# Patient Record
Sex: MALE | Employment: OTHER
[De-identification: names, ages, dates, MRNs, and addresses within clinical notes are randomized per-mention and may not be internally consistent; named-entity substitution may affect disease eponyms.]

---

## 2020-12-02 ENCOUNTER — TRANSCRIBE ORDERS (OUTPATIENT)
Dept: OTHER | Age: 80
End: 2020-12-02

## 2020-12-02 DIAGNOSIS — D3A.8 NEUROENDOCRINE TUMOR OF PANCREAS (H): Primary | ICD-10-CM

## 2020-12-04 NOTE — TELEPHONE ENCOUNTER
ONCOLOGY INTAKE: Records Information      APPT INFORMATION: 12/28/20 - Monique - Haroldo  Referring provider:  Leonel Quinteros  Referring provider s clinic:  [facility]  Reason for visit/diagnosis:  neuroendocrine tumor of pancreas  Has patient been notified of appointment date and time?: yes    RECORDS INFORMATION:  Were the records received with the referral (via [employer])? In [facility]/    Has patient been seen for any external appt for this diagnosis? Yes - [facility]    ADDITIONAL INFORMATION:  Scheduled via [facility]  I called Clare & confirmed a video visit with Dr Amaya for 12/28/20 at 11am. Clare is getting a new computer and will have his daughter assist him with the video visit - Instructions sent via email to [email or web address]

## 2020-12-07 NOTE — TELEPHONE ENCOUNTER
Action    Action Taken 12/7/20:    -[facility] [id number]    -Imaging resolved, and now viewable to PACS  2:55 PM       RECORDS STATUS - ALL OTHER DIAGNOSIS      RECORDS RECEIVED FROM: [facility]   DATE RECEIVED:    NOTES STATUS DETAILS   OFFICE NOTE from referring provider ERIC  Jolie Eller - PAC: 11/30/20   OFFICE NOTE from medical oncologist     DIS[facility]   DISCHARGE REPORT from the ER     OPERATIVE REPORT [facility] 11/16/20: EUS   MEDICATION LIST [facility]   CLINICAL TRIAL TREATMENTS TO DATE     LABS     PATHOLOGY REPORTS [facility], Report in CE, Slides requested 12/7 11/16/20   ANYTHING RELATED TO DIAGNOSIS     GENONOMIC TESTING     TYPE:     IMAGING (NEED IMAGES & REPORT)     CT SCANS PACS 10/27/20: [facility]   MRI     MAMMO     ULTRASOUND     PET

## 2020-12-10 NOTE — TELEPHONE ENCOUNTER
Pathology received from [facility] 12/10/2020 and taken to 5th floor to be processed

## 2020-12-11 LAB — COPATH REPORT: NORMAL

## 2020-12-11 PROCEDURE — 999N001032 HC STATISTIC REVIEW OUTSIDE SLIDES TC 88321: Performed by: INTERNAL MEDICINE

## 2020-12-11 PROCEDURE — 88321 CONSLTJ&REPRT SLD PREP ELSWR: CPT | Performed by: PATHOLOGY

## 2020-12-28 ENCOUNTER — PRE VISIT (OUTPATIENT)
Dept: ONCOLOGY | Facility: CLINIC | Age: 80
End: 2020-12-28

## 2020-12-28 ENCOUNTER — VIRTUAL VISIT (OUTPATIENT)
Dept: ONCOLOGY | Facility: CLINIC | Age: 80
End: 2020-12-28
Attending: INTERNAL MEDICINE
Payer: MEDICARE

## 2020-12-28 DIAGNOSIS — D3A.8 NEUROENDOCRINE TUMOR OF PANCREAS (H): ICD-10-CM

## 2020-12-28 PROCEDURE — 99204 OFFICE O/P NEW MOD 45 MIN: CPT | Mod: 95 | Performed by: INTERNAL MEDICINE

## 2020-12-28 PROCEDURE — 999N001193 HC VIDEO/TELEPHONE VISIT; NO CHARGE

## 2020-12-28 RX ORDER — DILTIAZEM HYDROCHLORIDE 120 MG/1
CAPSULE, EXTENDED RELEASE ORAL
COMMUNITY
Start: 2020-02-27

## 2020-12-28 RX ORDER — SULFASALAZINE 500 MG/1
TABLET ORAL
COMMUNITY
Start: 2020-02-27

## 2020-12-28 RX ORDER — CHLORAL HYDRATE 500 MG
1 CAPSULE ORAL
COMMUNITY

## 2020-12-28 RX ORDER — MECLIZINE HCL 25MG 25 MG/1
25 TABLET, CHEWABLE ORAL
COMMUNITY
Start: 2020-11-30 | End: 2020-12-30

## 2020-12-28 RX ORDER — MECLIZINE HYDROCHLORIDE 25 MG/1
25 TABLET ORAL
COMMUNITY

## 2020-12-28 RX ORDER — FINASTERIDE 5 MG/1
TABLET, FILM COATED ORAL
COMMUNITY
Start: 2019-02-21

## 2020-12-28 RX ORDER — SPIRONOLACTONE 25 MG/1
TABLET ORAL
COMMUNITY
Start: 2020-02-27

## 2020-12-28 RX ORDER — ASCORBIC ACID 500 MG
TABLET ORAL
COMMUNITY
Start: 2019-02-21

## 2020-12-28 RX ORDER — UBIDECARENONE 100 MG
100 CAPSULE ORAL
COMMUNITY

## 2020-12-28 RX ORDER — ASPIRIN 81 MG/1
325 TABLET, CHEWABLE ORAL
COMMUNITY
Start: 2019-02-21

## 2020-12-28 NOTE — LETTER
"    12/28/2020         RE: Sean Roblero  [id number] [facility] [id number]        Dear Colleague,    Thank you for referring your patient, Sean Roblero, to the [facility]. Please see a copy of my visit note below.    Sean Roblero is a 80 year old male who is being evaluated via a billable video visit.      The patient has been notified of following:     \"This video visit will be conducted via a call between you and your physician/provider. We have found that certain health care needs can be provided without the need for an in-person physical exam.  This service lets us provide the care you need with a video conversation.  If a prescription is necessary we can send it directly to your pharmacy.  If lab work is needed we can place an order for that and you can then stop by our lab to have the test done at a later time.    Video visits are billed at different rates depending on your insurance coverage.  Please reach out to your insurance provider with any questions.    If during the course of the call the physician/provider feels a video visit is not appropriate, you will not be charged for this service.\"    Patient has given verbal consent for Video visit? Yes  How would you like to obtain your AVS? Mail a copy  If you are dropped from the video visit, the video invite should be resent to: Send to e-mail at: [email or web address]  Will anyone else be joining your video visit? Yes: Mark. How would they like to receive their invitation? Text to cell phone: [phone]      Vitals - Patient Reported  Weight (Patient Reported): 75.8 kg (167 lb)  Height (Patient Reported): 162.6 cm (5' 4\")  BMI (Based on Pt Reported Ht/Wt): 28.67  Pain Score: No Pain (0)      Maryjane FIGUEROA    Video-Visit Details    Type of service:  Video Visit    Video Start Time: 1100  Video End Time: 1120    Originating Location (pt. Location): Home    Distant Location (provider location):  [facility]" "[facility] CANCER [facility]     Platform used for Video Visit: Louie Amaya MD      [facility] PHYSICIANS  MEDICAL ONCOLOGY    NEW PATIENT VISIT NOTE    Reason for consultation: well differentiated neuroendocrine tumor of pancreas    Referring Provider: Leonel Quinteros MD    Oncology Treatment Summary  1. October 2020 admitted to hospital after \"found down\". Unclear what caused this. He negative CT PE run, on CT AP was incidentally found to have a 1 x 1.3 cm mass in the pancreatic tail without other abnormality. Subsequently had a EUS/ERCP 11/16/2020 where the mass was noted to be 8 x 7 mm and bx. Pathology came back a well differentiated neuroendocrine tumor. Ki 67 was felt to be quite low but could not be calculated due to lack of cellularity.     HISTORY OF PRESENTING ILLNESS  Very pleasant 79 yo male with the above history. He notes he had a 'spell' and fell and went to the hospital. There was never anything delineated as the cause of his episode. Never the less at the hospital he had a CT PE run that was negative and a CT AP that incidentally found a 1.3 cm mass in the tail of the pancreas. He followed up with GI and had a EUS/ERCP with no ductal abnormalities, but again was noted to have a 8 x 7 mm mass in the pancreatic tail. Bx found a well differentiated neuroendocrine tumor. He has met with a surgeon near [facility]. He is otherwise doing well. No n/v/d/c. No Flushing or other GI symptoms.      PAST MEDICAL HISTORY  HTN, RA, Prostate cacner Melina 3 + 3 in 2009 undergoing active surveillance with minimal change in PSA since, BPH      CURRENT OUTPATIENT MEDICATIONS  Current Outpatient Medications   Medication     ascorbic acid 1000 MG TABS tablet     aspirin (ASA) 81 MG chewable tablet     cholecalciferol 50 MCG (2000 UT) CAPS     co-enzyme Q-10 100 MG CAPS capsule     diltiazem ER (DILT-XR) 120 MG 24 hr capsule     finasteride (PROSCAR) 5 MG tablet     fish oil-omega-3 fatty acids " 1000 MG capsule     Magnesium Oxide-Pyridoxine HCl 362-20 MG TABS     meclizine (ANTIVERT) 25 MG tablet     meclizine 25 MG CHEW     spironolactone (ALDACTONE) 25 MG tablet     sulfaSALAzine (AZULFIDINE) 500 MG tablet     vitamin C (ASCORBIC ACID) 500 MG tablet     No current facility-administered medications for this visit.         ALLERGIES     Allergies   Allergen Reactions     Atorvastatin Muscle Pain (Myalgia)     Fluvastatin      Other reaction(s): Unknown Reaction     Pravastatin Muscle Pain (Myalgia)     Simvastatin      Other reaction(s): Unknown Reaction        SOCIAL HISTORY  He is a , his wife recently  of cancer. He lives in [place], [place], he is a retired /. Very slight hx of tobacco quit in . No excessive etoh.     FAMILY HISTORY  Son had rectal cacner at 44.     REVIEW OF SYSTEMS  Review Of Systems  Skin: negative  Eyes: negative  Ears/Nose/Throat: negative  Respiratory: No shortness of breath, dyspnea on exertion, cough, or hemoptysis  Cardiovascular: negative  Gastrointestinal: negative  Genitourinary: negative  Musculoskeletal: negative  Neurologic: negative  Psychiatric: negative  Hematologic/Lymphatic/Immunologic: negative  Endocrine: negative      PHYSICAL EXAM  B/P: Data Unavailable, T: Data Unavailable, P: Data Unavailable, R: Data Unavailable  Wt Readings from Last 3 Encounters:   No data found for Wt       ECOG PPS0    Physical Exam  Constitutional:       Appearance: Normal appearance. He is normal weight.   HENT:      Head: Normocephalic and atraumatic.      Nose: Nose normal.   Eyes:      Extraocular Movements: Extraocular movements intact.      Conjunctiva/sclera: Conjunctivae normal.      Pupils: Pupils are equal, round, and reactive to light.   Pulmonary:      Effort: Pulmonary effort is normal.   Neurological:      General: No focal deficit present.      Mental Status: He is alert and oriented to person, place, and time. Mental status is at baseline.    Psychiatric:         Mood and Affect: Mood normal.         Behavior: Behavior normal.         Thought Content: Thought content normal.         Judgment: Judgment normal.              LABORATORY AND IMAGING STUDIES  No results for input(s): NA, POTASSIUM, CHLORIDE, CO2, ANIONGAP, BUN, CR, GLC, REBEKA in the last 23600 hours.  No results for input(s): MAG, PHOS in the last 57286 hours.  No results for input(s): WBC, HGB, PLT, MCV, NEUTROPHIL in the last 97939 hours.  No results for input(s): BILITOTAL, ALKPHOS, ALT, AST, ALBUMIN, LDH in the last 36076 hours.  No results found for: TSH  No results for input(s): CEA in the last 02731 hours.  No results found for this or any previous visit.     ASSESSMENT  AND RECOMMENDATIONS:    1.  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 RA on infusional therapy  3. Prostate cancer Melina 3 + 3, PSA 0.97 on active surveillance.       Plan    I reviewed his well differentiated neuroendocrine pancreatic tumor and the natural history of it. I told him at this point I do not think there is much benefit to further studies. Chromogranin A level is not terribly helpful. We could do a 24 hour 5HIAA level, however given that this was found incidentally and he is totally asymptomatic I doubt this would be helpful either.  We discussed the options for treatment with either surgical resection of the primary lesion in the pancreas which would involve a distal pancreatectomy and possible splenectomy the other option is for active surveillance. Given that the lesion is < 2 cm, incidentally found, and is well differentiated and low grade (although ki 67 could not be calculated due to low cellularity) it would fall within guidelines to observe. We discussed this would involve imaging every 4 months to start with and with time move to q 6 months. At this point given all these factors and the covid pandemic, he would like to observe for now and repeat a scan in 4  months and see how the scan looks and what is going on with the pandemic. I told him this is totally reasonable.    He will do the scan closer to home and we will scheduled a follow-up after.    Halle Amaya MD on 12/28/2020 at 12:06 PM            Again, thank you for allowing me to participate in the care of your patient.        Sincerely,        Halle Amaya MD

## 2020-12-28 NOTE — PROGRESS NOTES
"Sean Roblero is a 80 year old male who is being evaluated via a billable video visit.      The patient has been notified of following:     \"This video visit will be conducted via a call between you and your physician/provider. We have found that certain health care needs can be provided without the need for an in-person physical exam.  This service lets us provide the care you need with a video conversation.  If a prescription is necessary we can send it directly to your pharmacy.  If lab work is needed we can place an order for that and you can then stop by our lab to have the test done at a later time.    Video visits are billed at different rates depending on your insurance coverage.  Please reach out to your insurance provider with any questions.    If during the course of the call the physician/provider feels a video visit is not appropriate, you will not be charged for this service.\"    Patient has given verbal consent for Video visit? Yes  How would you like to obtain your AVS? Mail a copy  If you are dropped from the video visit, the video invite should be resent to: Send to e-mail at: [email or web address]  Will anyone else be joining your video visit? Yes: Mark. How would they like to receive their invitation? Text to cell phone: [phone]      Vitals - Patient Reported  Weight (Patient Reported): 75.8 kg (167 lb)  Height (Patient Reported): 162.6 cm (5' 4\")  BMI (Based on Pt Reported Ht/Wt): 28.67  Pain Score: No Pain (0)      Maryjane FIGUEROA    Video-Visit Details    Type of service:  Video Visit    Video Start Time: 1100  Video End Time: 1120    Originating Location (pt. Location): Home    Distant Location (provider location):  [facility] CANCER [facility]     Platform used for Video Visit: Louie Amaya MD      [facility]  MEDICAL ONCOLOGY    NEW PATIENT VISIT NOTE    Reason for consultation: well differentiated neuroendocrine tumor of " "pancreas    Referring Provider: Leonel Quinteros MD    Oncology Treatment Summary  1. October 2020 admitted to hospital after \"found down\". Unclear what caused this. He negative CT PE run, on CT AP was incidentally found to have a 1 x 1.3 cm mass in the pancreatic tail without other abnormality. Subsequently had a EUS/ERCP 11/16/2020 where the mass was noted to be 8 x 7 mm and bx. Pathology came back a well differentiated neuroendocrine tumor. Ki 67 was felt to be quite low but could not be calculated due to lack of cellularity.     HISTORY OF PRESENTING ILLNESS  Very pleasant 79 yo male with the above history. He notes he had a 'spell' and fell and went to the hospital. There was never anything delineated as the cause of his episode. Never the less at the hospital he had a CT PE run that was negative and a CT AP that incidentally found a 1.3 cm mass in the tail of the pancreas. He followed up with GI and had a EUS/ERCP with no ductal abnormalities, but again was noted to have a 8 x 7 mm mass in the pancreatic tail. Bx found a well differentiated neuroendocrine tumor. He has met with a surgeon near [facility]. He is otherwise doing well. No n/v/d/c. No Flushing or other GI symptoms.      PAST MEDICAL HISTORY  HTN, RA, Prostate cacner Melina 3 + 3 in 2009 undergoing active surveillance with minimal change in PSA since, BPH      CURRENT OUTPATIENT MEDICATIONS  Current Outpatient Medications   Medication     ascorbic acid 1000 MG TABS tablet     aspirin (ASA) 81 MG chewable tablet     cholecalciferol 50 MCG (2000 UT) CAPS     co-enzyme Q-10 100 MG CAPS capsule     diltiazem ER (DILT-XR) 120 MG 24 hr capsule     finasteride (PROSCAR) 5 MG tablet     fish oil-omega-3 fatty acids 1000 MG capsule     Magnesium Oxide-Pyridoxine HCl 362-20 MG TABS     meclizine (ANTIVERT) 25 MG tablet     meclizine 25 MG CHEW     spironolactone (ALDACTONE) 25 MG tablet     sulfaSALAzine (AZULFIDINE) 500 MG tablet     vitamin C (ASCORBIC ACID) " 500 MG tablet     No current facility-administered medications for this visit.         ALLERGIES     Allergies   Allergen Reactions     Atorvastatin Muscle Pain (Myalgia)     Fluvastatin      Other reaction(s): Unknown Reaction     Pravastatin Muscle Pain (Myalgia)     Simvastatin      Other reaction(s): Unknown Reaction        SOCIAL HISTORY  He is a , his wife recently  of cancer. He lives in [place], [place], he is a retired /. Very slight hx of tobacco quit in . No excessive etoh.     FAMILY HISTORY  Son had rectal cacner at 44.     REVIEW OF SYSTEMS  Review Of Systems  Skin: negative  Eyes: negative  Ears/Nose/Throat: negative  Respiratory: No shortness of breath, dyspnea on exertion, cough, or hemoptysis  Cardiovascular: negative  Gastrointestinal: negative  Genitourinary: negative  Musculoskeletal: negative  Neurologic: negative  Psychiatric: negative  Hematologic/Lymphatic/Immunologic: negative  Endocrine: negative      PHYSICAL EXAM  B/P: Data Unavailable, T: Data Unavailable, P: Data Unavailable, R: Data Unavailable  Wt Readings from Last 3 Encounters:   No data found for Wt       ECOG PPS0    Physical Exam  Constitutional:       Appearance: Normal appearance. He is normal weight.   HENT:      Head: Normocephalic and atraumatic.      Nose: Nose normal.   Eyes:      Extraocular Movements: Extraocular movements intact.      Conjunctiva/sclera: Conjunctivae normal.      Pupils: Pupils are equal, round, and reactive to light.   Pulmonary:      Effort: Pulmonary effort is normal.   Neurological:      General: No focal deficit present.      Mental Status: He is alert and oriented to person, place, and time. Mental status is at baseline.   Psychiatric:         Mood and Affect: Mood normal.         Behavior: Behavior normal.         Thought Content: Thought content normal.         Judgment: Judgment normal.              LABORATORY AND IMAGING STUDIES  No results for input(s): NA,  POTASSIUM, CHLORIDE, CO2, ANIONGAP, BUN, CR, GLC, REBEKA in the last 74227 hours.  No results for input(s): MAG, PHOS in the last 71754 hours.  No results for input(s): WBC, HGB, PLT, MCV, NEUTROPHIL in the last 48624 hours.  No results for input(s): BILITOTAL, ALKPHOS, ALT, AST, ALBUMIN, LDH in the last 73105 hours.  No results found for: TSH  No results for input(s): CEA in the last 95799 hours.  No results found for this or any previous visit.     ASSESSMENT  AND RECOMMENDATIONS:    1.  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 RA on infusional therapy  3. Prostate cancer Melina 3 + 3, PSA 0.97 on active surveillance.       Plan    I reviewed his well differentiated neuroendocrine pancreatic tumor and the natural history of it. I told him at this point I do not think there is much benefit to further studies. Chromogranin A level is not terribly helpful. We could do a 24 hour 5HIAA level, however given that this was found incidentally and he is totally asymptomatic I doubt this would be helpful either.  We discussed the options for treatment with either surgical resection of the primary lesion in the pancreas which would involve a distal pancreatectomy and possible splenectomy the other option is for active surveillance. Given that the lesion is < 2 cm, incidentally found, and is well differentiated and low grade (although ki 67 could not be calculated due to low cellularity) it would fall within guidelines to observe. We discussed this would involve imaging every 4 months to start with and with time move to q 6 months. At this point given all these factors and the covid pandemic, he would like to observe for now and repeat a scan in 4 months and see how the scan looks and what is going on with the pandemic. I told him this is totally reasonable.    He will do the scan closer to home and we will scheduled a follow-up after.    Halle Amaya MD on 12/28/2020 at 12:06  PM

## 2020-12-28 NOTE — LETTER
Date:January 6, 2021      Patient was self referred, no letter generated. Do not send.        [facility] Health Information

## 2021-01-14 ENCOUNTER — HEALTH MAINTENANCE LETTER (OUTPATIENT)
Age: 81
End: 2021-01-14

## 2021-04-02 DIAGNOSIS — D3A.8 NEUROENDOCRINE TUMOR OF PANCREAS (H): Primary | ICD-10-CM

## 2021-04-26 ENCOUNTER — VIRTUAL VISIT (OUTPATIENT)
Dept: ONCOLOGY | Facility: CLINIC | Age: 81
End: 2021-04-26
Attending: INTERNAL MEDICINE
Payer: MEDICARE

## 2021-04-26 DIAGNOSIS — D3A.8 NEUROENDOCRINE TUMOR OF PANCREAS (H): Primary | ICD-10-CM

## 2021-04-26 PROCEDURE — 999N001193 HC VIDEO/TELEPHONE VISIT; NO CHARGE

## 2021-04-26 PROCEDURE — 99213 OFFICE O/P EST LOW 20 MIN: CPT | Mod: 95 | Performed by: INTERNAL MEDICINE

## 2021-04-26 NOTE — LETTER
"    4/26/2021         RE: Sean Roblero  [address] [address] [facility] [address]        Dear Colleague,    Thank you for referring your patient, Sean Roblero, to the [facility] CANCER [facility]. Please see a copy of my visit note below.    Clare is a 80 year old who is being evaluated via a billable video visit.      How would you like to obtain your AVS? MyChart     If the video visit is dropped, the invitation should be resent by: Text to cell phone: [phone]     Will anyone else be joining your video visit? No          Vitals - Patient Reported  Pain Score: Moderate Pain (5)  Pain Loc: (ALL OVER)    Wes James LPN    Video Start Time: 0830  Video-Visit Details    Type of service:  Video Visit    Video End Time:0842    Originating Location (pt. Location): Home    Distant Location (provider location):  [facility]     Platform used for Video Visit: [employer]    [facility] PHYSICIANS  MEDICAL ONCOLOGY    RETURN PATIENT VISIT NOTE    Reason for visit: well differentiated neuroendocrine tumor of pancreas      Oncology Treatment Summary  1. October 2020 admitted to hospital after \"found down\". Unclear what caused this. He negative CT PE run, on CT AP was incidentally found to have a 1 x 1.3 cm mass in the pancreatic tail without other abnormality. Subsequently had a EUS/ERCP 11/16/2020 where the mass was noted to be 8 x 7 mm and bx. Pathology came back a well differentiated neuroendocrine tumor. Ki 67 was felt to be quite low but could not be calculated due to lack of cellularity.     HISTORY OF PRESENTING ILLNESS  Very pleasant 79 yo male with the above history. He notes he had a 'spell' and fell and went to the hospital. There was never anything delineated as the cause of his episode. Never the less at the hospital he had a CT PE run that was negative and a CT AP that incidentally found a 1.3 cm mass in the tail of the pancreas. He followed up with GI " and had a EUS/ERCP with no ductal abnormalities, but again was noted to have a 8 x 7 mm mass in the pancreatic tail. Bx found a well differentiated neuroendocrine tumor.  Due to the small size of the lesion he is on observation.    He presents today for a video visit during the COVID-19 pandemic.  His daughter is on the call with him.  He has been feeling well since I last saw him in December.  No nausea vomiting abdominal pain fevers chills or night sweats he is still somewhat down from the difficulties he went through last year with his son and wife dying.  He is thinking about getting the shingles vaccine and is wondering how this is covered by his insurance he otherwise states he has been feeling well he does not have any interest in getting the Covid vaccine.     PAST MEDICAL HISTORY  HTN, RA, Prostate cacner [place] 3 + 3 in  undergoing active surveillance with minimal change in PSA since, BPH      CURRENT OUTPATIENT MEDICATIONS  Current Outpatient Medications   Medication     ascorbic acid 1000 MG TABS tablet     aspirin (ASA) 81 MG chewable tablet     cholecalciferol 50 MCG (2000) CAPS     co-enzyme Q-10 100 MG CAPS capsule     diltiazem ER (DILT-XR) 120 MG 24 hr capsule     finasteride (PROSCAR) 5 MG tablet     fish oil-omega-3 fatty acids 1000 MG capsule     Magnesium Oxide-Pyridoxine HCl 362-20 MG TABS     meclizine (ANTIVERT) 25 MG tablet     spironolactone (ALDACTONE) 25 MG tablet     sulfaSALAzine (AZULFIDINE) 500 MG tablet     vitamin C (ASCORBIC ACID) 500 MG tablet     No current facility-administered medications for this visit.         ALLERGIES     Allergies   Allergen Reactions     Atorvastatin Muscle Pain (Myalgia)     Fluvastatin      Other reaction(s): Unknown Reaction     Pravastatin Muscle Pain (Myalgia)     Simvastatin      Other reaction(s): Unknown Reaction        SOCIAL HISTORY  He is a , his wife recently  of cancer. He lives in [place], [place], he is a retired  /. Very slight hx of tobacco quit in 1960. No excessive etoh.     FAMILY HISTORY  Son had rectal cacner at 44.     REVIEW OF SYSTEMS  Review Of Systems  Skin: negative  Eyes: negative  Ears/Nose/Throat: negative  Respiratory: No shortness of breath, dyspnea on exertion, cough, or hemoptysis  Cardiovascular: negative  Gastrointestinal: negative  Genitourinary: negative  Musculoskeletal: negative  Neurologic: negative  Psychiatric: negative  Hematologic/Lymphatic/Immunologic: negative  Endocrine: negative      PHYSICAL EXAM  B/P: Data Unavailable, T: Data Unavailable, P: Data Unavailable, R: Data Unavailable  Wt Readings from Last 3 Encounters:   No data found for Wt       ECOG PPS0    Physical Exam  Constitutional:       Appearance: Normal appearance. He is normal weight.   HENT:      Head: Normocephalic and atraumatic.      Nose: Nose normal.   Eyes:      Extraocular Movements: Extraocular movements intact.      Conjunctiva/sclera: Conjunctivae normal.      Pupils: Pupils are equal, round, and reactive to light.   Pulmonary:      Effort: Pulmonary effort is normal.   Neurological:      General: No focal deficit present.      Mental Status: He is alert and oriented to person, place, and time. Mental status is at baseline.   Psychiatric:         Mood and Affect: Mood normal.         Behavior: Behavior normal.         Thought Content: Thought content normal.         Judgment: Judgment normal.              LABORATORY AND IMAGING STUDIES  No results for input(s): NA, POTASSIUM, CHLORIDE, CO2, ANIONGAP, BUN, CR, GLC, REBEKA in the last 57723 hours.  No results for input(s): MAG, PHOS in the last 16610 hours.  No results for input(s): WBC, HGB, PLT, MCV, NEUTROPHIL in the last 97724 hours.  No results for input(s): BILITOTAL, ALKPHOS, ALT, AST, ALBUMIN, LDH in the last 37325 hours.  No results found for: TSH  No results for input(s): CEA in the last 19409 hours.  No results found for this or any previous  visit.         ASSESSMENT  AND RECOMMENDATIONS:    1.  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 RA on infusional therapy  3. Prostate cancer [place] 3 + 3, PSA 0.97 on active surveillance.       Plan    He had a CT of the abdomen pelvis done at [facility] on April 9.  This showed the lesion in his tail of his pancreas was stable it did not show any increase in size.  He is asymptomatic without diarrhea flushing or other symptoms.  We discussed routine follow-up and he will do scans every 4 months this year and assuming the lesion is stable we will go to every 6 months next year.    We discussed the shingles vaccine and I do think this is a good idea I am uncertain of how coverage works and have suggested that he call his coverage company it looks like it would be covered under part D and he would need to see if he has coverage for this.    Halle Amaya MD on 4/26/2021 at 8:47 AM          Again, thank you for allowing me to participate in the care of your patient.        Sincerely,        Halle Amaya MD

## 2021-04-26 NOTE — PROGRESS NOTES
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 October 2020 admitted to hospital after \"found down\". Unclear what caused this. He negative CT PE run, on CT AP was incidentally found to have a 1 x 1.3 cm mass in the pancreatic tail without other abnormality. Subsequently had a EUS/ERCP 11/16/2020 where the mass was noted to be 8 x 7 mm and bx. Pathology came back a well differentiated neuroendocrine tumor. Ki 67 was felt to be quite low but could not be calculated due to lack of cellularity.     HISTORY OF PRESENTING ILLNESS  Very pleasant 81 yo male with the above history. He notes he had a 'spell' and fell and went to the hospital. There was never anything delineated as the cause of his episode. Never the less at the hospital he had a CT PE run that was negative and a CT AP that incidentally found a 1.3 cm mass in the tail of the pancreas. He followed up with GI and had a EUS/ERCP with no ductal abnormalities, but again was noted to have a 8 x 7 mm mass in the pancreatic tail. Bx found a well differentiated neuroendocrine tumor.  Due to the small size of the lesion he is on observation.    He presents today for a video visit during the COVID-19 pandemic.  His daughter is on the call with him.  He has been feeling well since I last saw him in December.  No nausea vomiting abdominal pain fevers chills or night sweats he is still somewhat down from the difficulties he went through last year with his son and wife dying.  He is thinking about getting the shingles vaccine and is wondering how this is covered by his insurance he otherwise states he has been feeling well he does not have any interest in getting the Covid vaccine.     PAST MEDICAL HISTORY  HTN, RA, Prostate cacner [place] 3 + 3 in 2009 undergoing active surveillance with minimal change in PSA since, BPH      " CURRENT OUTPATIENT MEDICATIONS  Current Outpatient Medications   Medication     ascorbic acid 1000 MG TABS tablet     aspirin (ASA) 81 MG chewable tablet     cholecalciferol 50 MCG (2000) CAPS     co-enzyme Q-10 100 MG CAPS capsule     diltiazem ER (DILT-XR) 120 MG 24 hr capsule     finasteride (PROSCAR) 5 MG tablet     fish oil-omega-3 fatty acids 1000 MG capsule     Magnesium Oxide-Pyridoxine HCl 362-20 MG TABS     meclizine (ANTIVERT) 25 MG tablet     spironolactone (ALDACTONE) 25 MG tablet     sulfaSALAzine (AZULFIDINE) 500 MG tablet     vitamin C (ASCORBIC ACID) 500 MG tablet     No current facility-administered medications for this visit.         ALLERGIES     Allergies   Allergen Reactions     Atorvastatin Muscle Pain (Myalgia)     Fluvastatin      Other reaction(s): Unknown Reaction     Pravastatin Muscle Pain (Myalgia)     Simvastatin      Other reaction(s): Unknown Reaction        SOCIAL HISTORY  He is a , his wife recently  of cancer. He lives in [place], [place], he is a retired /. Very slight hx of tobacco quit in . No excessive etoh.     FAMILY HISTORY  Son had rectal cacner at 44.     REVIEW OF SYSTEMS  Review Of Systems  Skin: negative  Eyes: negative  Ears/Nose/Throat: negative  Respiratory: No shortness of breath, dyspnea on exertion, cough, or hemoptysis  Cardiovascular: negative  Gastrointestinal: negative  Genitourinary: negative  Musculoskeletal: negative  Neurologic: negative  Psychiatric: negative  Hematologic/Lymphatic/Immunologic: negative  Endocrine: negative      PHYSICAL EXAM  B/P: Data Unavailable, T: Data Unavailable, P: Data Unavailable, R: Data Unavailable  Wt Readings from Last 3 Encounters:   No data found for Wt       ECOG PPS0    Physical Exam  Constitutional:       Appearance: Normal appearance. He is normal weight.   HENT:      Head: Normocephalic and atraumatic.      Nose: Nose normal.   Eyes:      Extraocular Movements: Extraocular movements  intact.      Conjunctiva/sclera: Conjunctivae normal.      Pupils: Pupils are equal, round, and reactive to light.   Pulmonary:      Effort: Pulmonary effort is normal.   Neurological:      General: No focal deficit present.      Mental Status: He is alert and oriented to person, place, and time. Mental status is at baseline.   Psychiatric:         Mood and Affect: Mood normal.         Behavior: Behavior normal.         Thought Content: Thought content normal.         Judgment: Judgment normal.              LABORATORY AND IMAGING STUDIES  No results for input(s): NA, POTASSIUM, CHLORIDE, CO2, ANIONGAP, BUN, CR, GLC, REBEKA in the last 16967 hours.  No results for input(s): MAG, PHOS in the last 98941 hours.  No results for input(s): WBC, HGB, PLT, MCV, NEUTROPHIL in the last 55606 hours.  No results for input(s): BILITOTAL, ALKPHOS, ALT, AST, ALBUMIN, LDH in the last 92555 hours.  No results found for: TSH  No results for input(s): CEA in the last 01127 hours.  No results found for this or any previous visit.         ASSESSMENT  AND RECOMMENDATIONS:    1.  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 RA on infusional therapy  3. Prostate cancer [place] 3 + 3, PSA 0.97 on active surveillance.       Plan    He had a CT of the abdomen pelvis done at [facility] on April 9.  This showed the lesion in his tail of his pancreas was stable it did not show any increase in size.  He is asymptomatic without diarrhea flushing or other symptoms.  We discussed routine follow-up and he will do scans every 4 months this year and assuming the lesion is stable we will go to every 6 months next year.    We discussed the shingles vaccine and I do think this is a good idea I am uncertain of how coverage works and have suggested that he call his coverage company it looks like it would be covered under part D and he would need to see if he has coverage for this.    Halle Amaya MD on 4/26/2021 at  8:47 AM

## 2021-04-26 NOTE — PROGRESS NOTES
Clare is a 80 year old who is being evaluated via a billable video visit.      How would you like to obtain your AVS? MyChart     If the video visit is dropped, the invitation should be resent by: Text to cell phone: [phone]     Will anyone else be joining your video visit? No          Vitals - Patient Reported  Pain Score: Moderate Pain (5)  Pain Loc: (ALL OVER)    Wes James LPN    Video Start Time: 0830  Video-Visit Details    Type of service:  Video Visit    Video End Time:0842    Originating Location (pt. Location): Home    Distant Location (provider location):  [facility] CANCER [facility]     Platform used for Video Visit: [employer]

## 2021-06-15 ENCOUNTER — TELEPHONE (OUTPATIENT)
Dept: ONCOLOGY | Facility: CLINIC | Age: 81
End: 2021-06-15

## 2021-06-15 NOTE — TELEPHONE ENCOUNTER
RN CARE COORDINATION      Date Received in Clinic: n/a  Name/Type of Form: CT order (Dr. Amaya)  Date Completed: 6/15/2021  Copy Mailed to patient: no  Disposition of Form: Faxed to [facility] @ [phone]    Plan for can week of 8/9 prior to video visit with Dr. Amaya on 8/23. [facility] to schedule. Tami is aware of the plan.      PIPPA BenderN, RN  RN Care Coordinator  [facility]

## 2021-08-23 ENCOUNTER — VIRTUAL VISIT (OUTPATIENT)
Dept: ONCOLOGY | Facility: CLINIC | Age: 81
End: 2021-08-23
Attending: INTERNAL MEDICINE
Payer: MEDICARE

## 2021-08-23 DIAGNOSIS — D3A.8 NEUROENDOCRINE TUMOR OF PANCREAS (H): Primary | ICD-10-CM

## 2021-08-23 PROCEDURE — 99213 OFFICE O/P EST LOW 20 MIN: CPT | Mod: 95 | Performed by: INTERNAL MEDICINE

## 2021-08-23 PROCEDURE — 999N001193 HC VIDEO/TELEPHONE VISIT; NO CHARGE

## 2021-08-23 NOTE — LETTER
"    8/23/2021         RE: Sean Roblero  [address] [address] [facility] [address]        Dear Colleague,    Thank you for referring your patient, Sean Roblero, to the [facility]. Please see a copy of my visit note below.    Patient Location [place]  How would you like to obtain your AVS? Mail a copy  If the video visit is dropped, the invitation should be resent by: Text to cell phone: [phone]  Will anyone else be joining your video visit? 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 October 2020 admitted to hospital after \"found down\". Unclear what caused this. He negative CT PE run, on CT AP was incidentally found to have a 1 x 1.3 cm mass in the pancreatic tail without other abnormality. Subsequently had a EUS/ERCP 11/16/2020 where the mass was noted to be 8 x 7 mm and bx. Pathology came back a well differentiated neuroendocrine tumor. Ki 67 was felt to be quite low but could not be calculated due to lack of cellularity.     HISTORY OF PRESENTING ILLNESS  Very pleasant 81 yo male with the above history. He notes he had a 'spell' and fell and went to the hospital. There was never anything delineated as the cause of his episode. Never the less at the hospital he had a CT PE run that was negative and a CT AP that incidentally found a 1.3 cm mass in the tail of the pancreas. He followed up with GI and had a EUS/ERCP with no ductal abnormalities, but again was noted to have a 8 x 7 mm mass in the pancreatic tail. Bx found a well differentiated neuroendocrine tumor.  Due to the small size of the lesion he is on observation.    Clare presents today for follow-up. He continues to do well he has some issues with muscle cramps in his legs that he takes magnesium for but no other new issues or concerns.      PAST " MEDICAL HISTORY  HTN, RA, Prostate cancer [place] 3 + 3 in 2009 undergoing active surveillance with minimal change in PSA since, BPH      CURRENT OUTPATIENT MEDICATIONS  Current Outpatient Medications   Medication     ascorbic acid 1000 MG TABS tablet     aspirin (ASA) 81 MG chewable tablet     cholecalciferol 50 MCG (2000) CAPS     co-enzyme Q-10 100 MG CAPS capsule     diltiazem ER (DILT-XR) 120 MG 24 hr capsule     fish oil-omega-3 fatty acids 1000 MG capsule     Magnesium Oxide-Pyridoxine HCl 362-20 MG TABS     meclizine (ANTIVERT) 25 MG tablet     spironolactone (ALDACTONE) 25 MG tablet     sulfaSALAzine (AZULFIDINE) 500 MG tablet     vitamin C (ASCORBIC ACID) 500 MG tablet     finasteride (PROSCAR) 5 MG tablet     No current facility-administered medications for this visit.        ALLERGIES     Allergies   Allergen Reactions     Atorvastatin Muscle Pain (Myalgia)     Fluvastatin      Other reaction(s): Unknown Reaction     Pravastatin Muscle Pain (Myalgia)     Simvastatin      Other reaction(s): Unknown Reaction        SOCIAL HISTORY  He is a , his wife recently  of cancer. He lives in [place], [place], he is a retired /. Very slight hx of tobacco quit in . No excessive etoh.     FAMILY HISTORY  Son had rectal cancer at 44.     REVIEW OF SYSTEMS  Review Of Systems  Skin: negative  Eyes: negative  Ears/Nose/Throat: negative  Respiratory: No shortness of breath, dyspnea on exertion, cough, or hemoptysis  Cardiovascular: negative  Gastrointestinal: negative  Genitourinary: negative  Musculoskeletal: negative  Neurologic: negative  Psychiatric: negative  Hematologic/Lymphatic/Immunologic: negative  Endocrine: negative      PHYSICAL EXAM  B/P: Data Unavailable, T: Data Unavailable, P: Data Unavailable, R: Data Unavailable  Wt Readings from Last 3 Encounters:   No data found for Wt       ECOG PPS0    Physical Exam  Constitutional:       Appearance: Normal appearance. He is normal  weight.   HENT:      Head: Normocephalic and atraumatic.      Nose: Nose normal.   Eyes:      Extraocular Movements: Extraocular movements intact.      Conjunctiva/sclera: Conjunctivae normal.      Pupils: Pupils are equal, round, and reactive to light.   Pulmonary:      Effort: Pulmonary effort is normal.   Neurological:      General: No focal deficit present.      Mental Status: He is alert and oriented to person, place, and time. Mental status is at baseline.   Psychiatric:         Mood and Affect: Mood normal.         Behavior: Behavior normal.         Thought Content: Thought content normal.         Judgment: Judgment normal.              LABORATORY AND IMAGING STUDIES  No results for input(s): NA, POTASSIUM, CHLORIDE, CO2, ANIONGAP, BUN, CR, GLC, REBEKA in the last 22261 hours.  No results for input(s): MAG, PHOS in the last 13406 hours.  No results for input(s): WBC, HGB, PLT, MCV, NEUTROPHIL in the last 96736 hours.  No results for input(s): BILITOTAL, ALKPHOS, ALT, AST, ALBUMIN, LDH in the last 03993 hours.  No results found for: TSH  No results for input(s): CEA in the last 99466 hours.  No results found for this or any previous visit.    Most recent imaging from 7/26/21 at [facility], report reviewed and stable with 1.1 cm pancreatic lesion.       ASSESSMENT  AND RECOMMENDATIONS:    1.  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 RA on infusional therapy  3. Prostate cancer [place] 3 + 3, PSA 0.97 on active surveillance.       Plan    I reviewed his most recent imaging from July 26, 2021 which was stable. I think we can certainly increase the interval between scans and do q 6 months this year and if there is no change then likely annual for a couple years. He will do imaging close to home and see me virtually in 6 months.    Halle Amaya MD on 8/23/2021 at 2:19 PM          Again, thank you for allowing me to participate in the care of your patient.         Sincerely,        Halle Amaya MD

## 2021-08-23 NOTE — PROGRESS NOTES
"Patient Location [place]  How would you like to obtain your AVS? Mail a copy  If the video visit is dropped, the invitation should be resent by: Text to cell phone: [phone]  Will anyone else be joining your video visit? Daughter in law    8 min video    [facility] PHYSICIANS  MEDICAL ONCOLOGY    RETURN PATIENT VISIT NOTE    Reason for visit: well differentiated neuroendocrine tumor of pancreas      Oncology Treatment Summary  1. October 2020 admitted to hospital after \"found down\". Unclear what caused this. He negative CT PE run, on CT AP was incidentally found to have a 1 x 1.3 cm mass in the pancreatic tail without other abnormality. Subsequently had a EUS/ERCP 11/16/2020 where the mass was noted to be 8 x 7 mm and bx. Pathology came back a well differentiated neuroendocrine tumor. Ki 67 was felt to be quite low but could not be calculated due to lack of cellularity.     HISTORY OF PRESENTING ILLNESS  Very pleasant 79 yo male with the above history. He notes he had a 'spell' and fell and went to the hospital. There was never anything delineated as the cause of his episode. Never the less at the hospital he had a CT PE run that was negative and a CT AP that incidentally found a 1.3 cm mass in the tail of the pancreas. He followed up with GI and had a EUS/ERCP with no ductal abnormalities, but again was noted to have a 8 x 7 mm mass in the pancreatic tail. Bx found a well differentiated neuroendocrine tumor.  Due to the small size of the lesion he is on observation.    Clare presents today for follow-up. He continues to do well he has some issues with muscle cramps in his legs that he takes magnesium for but no other new issues or concerns.      PAST MEDICAL HISTORY  HTN, RA, Prostate cancer [place] 3 + 3 in 2009 undergoing active surveillance with minimal change in PSA since, BPH      CURRENT OUTPATIENT MEDICATIONS  Current Outpatient Medications   Medication     ascorbic acid 1000 MG TABS tablet     " aspirin (ASA) 81 MG chewable tablet     cholecalciferol 50 MCG ( UT) CAPS     co-enzyme Q-10 100 MG CAPS capsule     diltiazem ER (DILT-XR) 120 MG 24 hr capsule     fish oil-omega-3 fatty acids 1000 MG capsule     Magnesium Oxide-Pyridoxine HCl 362-20 MG TABS     meclizine (ANTIVERT) 25 MG tablet     spironolactone (ALDACTONE) 25 MG tablet     sulfaSALAzine (AZULFIDINE) 500 MG tablet     vitamin C (ASCORBIC ACID) 500 MG tablet     finasteride (PROSCAR) 5 MG tablet     No current facility-administered medications for this visit.        ALLERGIES     Allergies   Allergen Reactions     Atorvastatin Muscle Pain (Myalgia)     Fluvastatin      Other reaction(s): Unknown Reaction     Pravastatin Muscle Pain (Myalgia)     Simvastatin      Other reaction(s): Unknown Reaction        SOCIAL HISTORY  He is a , his wife recently  of cancer. He lives in [place], [place], he is a retired /. Very slight hx of tobacco quit in . No excessive etoh.     FAMILY HISTORY  Son had rectal cancer at 44.     REVIEW OF SYSTEMS  Review Of Systems  Skin: negative  Eyes: negative  Ears/Nose/Throat: negative  Respiratory: No shortness of breath, dyspnea on exertion, cough, or hemoptysis  Cardiovascular: negative  Gastrointestinal: negative  Genitourinary: negative  Musculoskeletal: negative  Neurologic: negative  Psychiatric: negative  Hematologic/Lymphatic/Immunologic: negative  Endocrine: negative      PHYSICAL EXAM  B/P: Data Unavailable, T: Data Unavailable, P: Data Unavailable, R: Data Unavailable  Wt Readings from Last 3 Encounters:   No data found for Wt       ECOG PPS0    Physical Exam  Constitutional:       Appearance: Normal appearance. He is normal weight.   HENT:      Head: Normocephalic and atraumatic.      Nose: Nose normal.   Eyes:      Extraocular Movements: Extraocular movements intact.      Conjunctiva/sclera: Conjunctivae normal.      Pupils: Pupils are equal, round, and reactive to light.    Pulmonary:      Effort: Pulmonary effort is normal.   Neurological:      General: No focal deficit present.      Mental Status: He is alert and oriented to person, place, and time. Mental status is at baseline.   Psychiatric:         Mood and Affect: Mood normal.         Behavior: Behavior normal.         Thought Content: Thought content normal.         Judgment: Judgment normal.              LABORATORY AND IMAGING STUDIES  No results for input(s): NA, POTASSIUM, CHLORIDE, CO2, ANIONGAP, BUN, CR, GLC, REBEKA in the last 07982 hours.  No results for input(s): MAG, PHOS in the last 38603 hours.  No results for input(s): WBC, HGB, PLT, MCV, NEUTROPHIL in the last 97916 hours.  No results for input(s): BILITOTAL, ALKPHOS, ALT, AST, ALBUMIN, LDH in the last 76807 hours.  No results found for: TSH  No results for input(s): CEA in the last 67732 hours.  No results found for this or any previous visit.    Most recent imaging from 7/26/21 at [facility], report reviewed and stable with 1.1 cm pancreatic lesion.       ASSESSMENT  AND RECOMMENDATIONS:    1.  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 RA on infusional therapy  3. Prostate cancer [place] 3 + 3, PSA 0.97 on active surveillance.       Plan    I reviewed his most recent imaging from July 26, 2021 which was stable. I think we can certainly increase the interval between scans and do q 6 months this year and if there is no change then likely annual for a couple years. He will do imaging close to home and see me virtually in 6 months.    Halle Amaya MD on 8/23/2021 at 2:19 PM

## 2021-10-23 ENCOUNTER — HEALTH MAINTENANCE LETTER (OUTPATIENT)
Age: 81
End: 2021-10-23

## 2022-01-26 ENCOUNTER — TELEPHONE (OUTPATIENT)
Dept: ONCOLOGY | Facility: CLINIC | Age: 82
End: 2022-01-26
Payer: MEDICARE

## 2022-01-26 NOTE — TELEPHONE ENCOUNTER
RN CARE COORDINATION      Date Received in Clinic: n/a  Name/Type of Form: CT scan order   Date Completed: 1/26/2022  Copy Mailed to patient: No  Disposition of Form: Faxed to [facility] @ [phone]    Plan for CT scan week of 2/14 prior to 2/22/2022 virtual visit with Dr. Amaya.         ALETHEA Bender, RN  RN Care Coordinator  [facility]

## 2022-02-12 ENCOUNTER — HEALTH MAINTENANCE LETTER (OUTPATIENT)
Age: 82
End: 2022-02-12

## 2022-02-22 ENCOUNTER — VIRTUAL VISIT (OUTPATIENT)
Dept: ONCOLOGY | Facility: CLINIC | Age: 82
End: 2022-02-22
Payer: MEDICARE

## 2022-02-22 DIAGNOSIS — D3A.8 NEUROENDOCRINE TUMOR OF PANCREAS (H): Primary | ICD-10-CM

## 2022-02-22 PROCEDURE — 99213 OFFICE O/P EST LOW 20 MIN: CPT | Mod: 95 | Performed by: INTERNAL MEDICINE

## 2022-02-22 NOTE — PROGRESS NOTES
"6 min telephone, virtual video did not work    [facility] PHYSICIANS  MEDICAL ONCOLOGY    RETURN PATIENT VISIT NOTE    Reason for visit: well differentiated neuroendocrine tumor of pancreas    Oncology Treatment Summary  1. October 2020 admitted to hospital after \"found down\". Unclear what caused this. He negative CT PE run, on CT AP was incidentally found to have a 1 x 1.3 cm mass in the pancreatic tail without other abnormality. Subsequently had a EUS/ERCP 11/16/2020 where the mass was noted to be 8 x 7 mm and bx. Pathology came back a well differentiated neuroendocrine tumor. Ki 67 was felt to be quite low but could not be calculated due to lack of cellularity.     HISTORY OF PRESENTING ILLNESS  Very pleasant 81 yo male with the above history. He notes he had a 'spell' and fell and went to the hospital. There was never anything delineated as the cause of his episode. Never the less at the hospital he had a CT PE run that was negative and a CT AP that incidentally found a 1.3 cm mass in the tail of the pancreas. He followed up with GI and had a EUS/ERCP with no ductal abnormalities, but again was noted to have a 8 x 7 mm mass in the pancreatic tail. Bx found a well differentiated neuroendocrine tumor.  Due to the small size of the lesion he is on observation.     interim: In December he was hospitalized for 3 days with COVID (not vaccinated) he was then on O2 at home and is off it now but finds he has a catching breath/hiccup frequently since. No other new issues or problems     PAST MEDICAL HISTORY  HTN, RA, Prostate cancer [place] 3 + 3 in 2009 undergoing active surveillance with minimal change in PSA since, BPH      CURRENT OUTPATIENT MEDICATIONS  Current Outpatient Medications   Medication     ascorbic acid 1000 MG TABS tablet     aspirin (ASA) 81 MG chewable tablet     cholecalciferol 50 MCG (2000 UT) CAPS     co-enzyme Q-10 100 MG CAPS capsule     diltiazem ER (DILT-XR) 120 MG 24 hr capsule     " finasteride (PROSCAR) 5 MG tablet     fish oil-omega-3 fatty acids 1000 MG capsule     Magnesium Oxide-Pyridoxine HCl 362-20 MG TABS     meclizine (ANTIVERT) 25 MG tablet     spironolactone (ALDACTONE) 25 MG tablet     sulfaSALAzine (AZULFIDINE) 500 MG tablet     vitamin C (ASCORBIC ACID) 500 MG tablet     No current facility-administered medications for this visit.        ALLERGIES     Allergies   Allergen Reactions     Atorvastatin Muscle Pain (Myalgia)     Fluvastatin      Other reaction(s): Unknown Reaction     Pravastatin Muscle Pain (Myalgia)     Simvastatin      Other reaction(s): Unknown Reaction        SOCIAL HISTORY  He is a , his wife recently  of cancer. He lives in [place], [place], he is a retired /. Very slight hx of tobacco quit in . No excessive etoh.     FAMILY HISTORY  Son had rectal cancer at 44.     REVIEW OF SYSTEMS  Review Of Systems  Skin: negative  Eyes: negative  Ears/Nose/Throat: negative  Respiratory: No shortness of breath, dyspnea on exertion, cough, or hemoptysis  Cardiovascular: negative  Gastrointestinal: negative  Genitourinary: negative  Musculoskeletal: negative  Neurologic: negative  Psychiatric: negative  Hematologic/Lymphatic/Immunologic: negative  Endocrine: negative      PHYSICAL EXAM  B/P: Data Unavailable, T: Data Unavailable, P: Data Unavailable, R: Data Unavailable  Wt Readings from Last 3 Encounters:   No data found for Wt       ECOG PPS0    telephone  Psychiatric:         Mood and Affect: Mood normal.         Behavior: Behavior normal.         Thought Content: Thought content normal.         Judgment: Judgment normal.              LABORATORY AND IMAGING STUDIES  EXAM:   CT ABD AND PELVIS WITH IV CONTRAST     INDICATION:   Neuroendocrine tumor of pancreas O/A CT abdoment pelvis w/ contrast     TECHNIQUE:   Axial volumetric MDCT imaging was performed according to the standard protocol   with IV contrast. Multiplanar reconstruction images were  generated.         While obtaining CT images, dose reduction techniques were utilized including:     Automated exposure control, adjustment of mA and/or kV according to patient   size, and/or use of iterative reconstruction technique     RADIATION DOSE:   610 DLP (mGy cm)     COMPARISON:   07/26/2021     FINDINGS:   Visualized portions of the lung bases demonstrates minimal bibasilar scarring.   No focal consolidation or pleural effusions.  Decreased attenuation of the   liver when compared to the spleen.  Tiny less than 1 cm low-density lesions   within the dome of the liver, stable from the previous examination.  The   gallbladder, adrenal glands, and spleen demonstrate no suspicious masses.   Stable appearing bilateral low-density lesions within both kidneys most likely   reflective of cysts.  Diffuse fatty atrophy of the pancreas.  Solid-appearing   lesion within the tail the pancreas measuring up to 1.6 cm, not significantly   changed from the previous examination.     No dilated or thickened loops of bowel are visualized.  The appendix is normal   in caliber.  No free fluid or free air.  Moderate atherosclerotic changes   involving the abdominal aorta.  No pathologically enlarged retroperitoneal   lymph nodes.  Small fat containing periumbilical hernia.  No suspicious osseous   lesions.  No suspicious mesenteric masses.     IMPRESSION:   1. Stable appearing mass within the tail the pancreas consistent with the   patient's history of neuroendocrine tumor.  No evidence of progression.   2. Stable appearing bilateral renal cysts.   3. Mild diffuse fatty infiltration of the liver.         ASSESSMENT  AND RECOMMENDATIONS:    1.  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 RA on infusional therapy  3. Prostate cancer [place] 3 + 3, PSA 0.97 on active surveillance.       Plan    We discussed his imaging that shows the area in his pancreas is stable and as been since 10/2020 (18  months). These are generally slow growing lesions and we discussed doing reimaging in 6 vs 12 months. He would like to do 6 months and we will scheduled him for august and an apt with me after.    Encouraged Covid vaccination still.    Halle Amaya MD on 2/22/2022 at 10:58 AM

## 2022-02-22 NOTE — LETTER
"    2/22/2022         RE: Sean Roblero  [address] [place] [address]        Dear Colleague,    Thank you for referring your patient, Sean Roblero, to the [facility]. Please see a copy of my visit note below.    6 min telephone, virtual video did not work    [facility] PHYSICIANS  MEDICAL ONCOLOGY    RETURN PATIENT VISIT NOTE    Reason for visit: well differentiated neuroendocrine tumor of pancreas    Oncology Treatment Summary  1. October 2020 admitted to hospital after \"found down\". Unclear what caused this. He negative CT PE run, on CT AP was incidentally found to have a 1 x 1.3 cm mass in the pancreatic tail without other abnormality. Subsequently had a EUS/ERCP 11/16/2020 where the mass was noted to be 8 x 7 mm and bx. Pathology came back a well differentiated neuroendocrine tumor. Ki 67 was felt to be quite low but could not be calculated due to lack of cellularity.     HISTORY OF PRESENTING ILLNESS  Very pleasant 81 yo male with the above history. He notes he had a 'spell' and fell and went to the hospital. There was never anything delineated as the cause of his episode. Never the less at the hospital he had a CT PE run that was negative and a CT AP that incidentally found a 1.3 cm mass in the tail of the pancreas. He followed up with GI and had a EUS/ERCP with no ductal abnormalities, but again was noted to have a 8 x 7 mm mass in the pancreatic tail. Bx found a well differentiated neuroendocrine tumor.  Due to the small size of the lesion he is on observation.     interim: In December he was hospitalized for 3 days with COVID (not vaccinated) he was then on O2 at home and is off it now but finds he has a catching breath/hiccup frequently since. No other new issues or problems     PAST MEDICAL HISTORY  HTN, RA, Prostate cancer [place] 3 + 3 in 2009 undergoing active surveillance with minimal change in PSA since, BPH      CURRENT OUTPATIENT " MEDICATIONS  Current Outpatient Medications   Medication     ascorbic acid 1000 MG TABS tablet     aspirin (ASA) 81 MG chewable tablet     cholecalciferol 50 MCG (2000) CAPS     co-enzyme Q-10 100 MG CAPS capsule     diltiazem ER (DILT-XR) 120 MG 24 hr capsule     finasteride (PROSCAR) 5 MG tablet     fish oil-omega-3 fatty acids 1000 MG capsule     Magnesium Oxide-Pyridoxine HCl 362-20 MG TABS     meclizine (ANTIVERT) 25 MG tablet     spironolactone (ALDACTONE) 25 MG tablet     sulfaSALAzine (AZULFIDINE) 500 MG tablet     vitamin C (ASCORBIC ACID) 500 MG tablet     No current facility-administered medications for this visit.        ALLERGIES     Allergies   Allergen Reactions     Atorvastatin Muscle Pain (Myalgia)     Fluvastatin      Other reaction(s): Unknown Reaction     Pravastatin Muscle Pain (Myalgia)     Simvastatin      Other reaction(s): Unknown Reaction        SOCIAL HISTORY  He is a , his wife recently  of cancer. He lives in [place], [place], he is a retired /. Very slight hx of tobacco quit in . No excessive etoh.     FAMILY HISTORY  Son had rectal cancer at 44.     REVIEW OF SYSTEMS  Review Of Systems  Skin: negative  Eyes: negative  Ears/Nose/Throat: negative  Respiratory: No shortness of breath, dyspnea on exertion, cough, or hemoptysis  Cardiovascular: negative  Gastrointestinal: negative  Genitourinary: negative  Musculoskeletal: negative  Neurologic: negative  Psychiatric: negative  Hematologic/Lymphatic/Immunologic: negative  Endocrine: negative      PHYSICAL EXAM  B/P: Data Unavailable, T: Data Unavailable, P: Data Unavailable, R: Data Unavailable  Wt Readings from Last 3 Encounters:   No data found for Wt       ECOG PPS0    telephone  Psychiatric:         Mood and Affect: Mood normal.         Behavior: Behavior normal.         Thought Content: Thought content normal.         Judgment: Judgment normal.              LABORATORY AND IMAGING STUDIES  EXAM:   CT ABD  AND PELVIS WITH IV CONTRAST     INDICATION:   Neuroendocrine tumor of pancreas O/A CT abdoment pelvis w/ contrast     TECHNIQUE:   Axial volumetric MDCT imaging was performed according to the standard protocol   with IV contrast. Multiplanar reconstruction images were generated.         While obtaining CT images, dose reduction techniques were utilized including:     Automated exposure control, adjustment of mA and/or kV according to patient   size, and/or use of iterative reconstruction technique     RADIATION DOSE:   610 DLP (mGy cm)     COMPARISON:   07/26/2021     FINDINGS:   Visualized portions of the lung bases demonstrates minimal bibasilar scarring.   No focal consolidation or pleural effusions.  Decreased attenuation of the   liver when compared to the spleen.  Tiny less than 1 cm low-density lesions   within the dome of the liver, stable from the previous examination.  The   gallbladder, adrenal glands, and spleen demonstrate no suspicious masses.   Stable appearing bilateral low-density lesions within both kidneys most likely   reflective of cysts.  Diffuse fatty atrophy of the pancreas.  Solid-appearing   lesion within the tail the pancreas measuring up to 1.6 cm, not significantly   changed from the previous examination.     No dilated or thickened loops of bowel are visualized.  The appendix is normal   in caliber.  No free fluid or free air.  Moderate atherosclerotic changes   involving the abdominal aorta.  No pathologically enlarged retroperitoneal   lymph nodes.  Small fat containing periumbilical hernia.  No suspicious osseous   lesions.  No suspicious mesenteric masses.     IMPRESSION:   1. Stable appearing mass within the tail the pancreas consistent with the   patient's history of neuroendocrine tumor.  No evidence of progression.   2. Stable appearing bilateral renal cysts.   3. Mild diffuse fatty infiltration of the liver.         ASSESSMENT  AND RECOMMENDATIONS:    1.  Subcentimeter well  differentiated neuroendocrine tumor of tail of pancreas  2. RA on infusional therapy  3. Prostate cancer [place] 3 + 3, PSA 0.97 on active surveillance.       Plan    We discussed his imaging that shows the area in his pancreas is stable and as been since 10/2020 (18 months). These are generally slow growing lesions and we discussed doing reimaging in 6 vs 12 months. He would like to do 6 months and we will scheduled him for august and an apt with me after.    Encouraged Covid vaccination still.    Halle Amaya MD on 2/22/2022 at 10:58 AM            Again, thank you for allowing me to participate in the care of your patient.        Sincerely,        Halle Amaya MD

## 2022-08-04 ENCOUNTER — PATIENT OUTREACH (OUTPATIENT)
Dept: ONCOLOGY | Facility: CLINIC | Age: 82
End: 2022-08-04

## 2022-08-04 NOTE — PROGRESS NOTES
[facility]:  Cancer Care Note    Writer received a voicemail message from Karo with the [facility] (phone number: [phone]), regarding patient's CT abdomen/pelvis order from Dr. Amaya.  Karo wishes to clarify if Dr. Amaya would like the CT to be done with pancreas protocol, or with standard/routine CT protocol, based on patient's diagnosis.    Note routed to Dr. Amaya to advise, and to RNCC to contact Karo with recommendations.    Addendum 8/5/22 11:49 AM - Received response from Dr. Amaya - she would like the patient's CT scan to be with pancreas protocol.  Writer placed callback to [facility] this morning, to provide this recommendation.  Spoke with Karo, CT scheduling, who verbalized understanding, and states they will be calling the patient to get him scheduled.     Augustine Zavala, RN, BSN, OCN  RN Care Coordinator - Oncology  [facility]

## 2022-08-05 ENCOUNTER — PATIENT OUTREACH (OUTPATIENT)
Dept: ONCOLOGY | Facility: CLINIC | Age: 82
End: 2022-08-05

## 2022-08-05 DIAGNOSIS — D3A.8 NEUROENDOCRINE TUMOR OF PANCREAS (H): Primary | ICD-10-CM

## 2022-08-05 NOTE — PROGRESS NOTES
[facility]:  Cancer Care Note    Received callback from Karo at [facility] Imaging Department, requesting to have a lab order for creatinine faxed to them as patient will need to have this drawn prior to his upcoming CT scan.    Lab order entered, and has been co-signed by Dr. Amaya.  Order faxed to Karo's attention this afternoon at [phone].  Callback was also placed to Karo to notify her that the lab order is being faxed.     Augustine Zavala, RN, BSN, OCN  RN Care Coordinator - Oncology  [facility]

## 2022-08-23 ENCOUNTER — VIRTUAL VISIT (OUTPATIENT)
Dept: ONCOLOGY | Facility: CLINIC | Age: 82
End: 2022-08-23
Attending: INTERNAL MEDICINE
Payer: MEDICARE

## 2022-08-23 DIAGNOSIS — D3A.8 NEUROENDOCRINE TUMOR OF PANCREAS (H): Primary | ICD-10-CM

## 2022-08-23 PROCEDURE — 99442 PR PHYSICIAN TELEPHONE EVALUATION 11-20 MIN: CPT | Mod: 95 | Performed by: INTERNAL MEDICINE

## 2022-08-23 NOTE — LETTER
"    8/23/2022         RE: Sean Roblero  [id number] [facility] [address]        Dear Colleague,    Thank you for referring your patient, Sean Roblero, to the [facility]. Please see a copy of my visit note below.    Clare is a 82 year old who is being evaluated via a billable telephone visit.      What phone number would you like to be contacted at? [phone]  How would you like to obtain your AVS? Joanna  Phone call duration: 18minutes    Symone FLORES    [facility]  MEDICAL ONCOLOGY    RETURN PATIENT VISIT NOTE    Reason for visit: well differentiated neuroendocrine tumor of pancreas    Oncology Treatment Summary  1. October 2020 admitted to hospital after \"found down\". Unclear what caused this. He negative CT PE run, on CT AP was incidentally found to have a 1 x 1.3 cm mass in the pancreatic tail without other abnormality. Subsequently had a EUS/ERCP 11/16/2020 where the mass was noted to be 8 x 7 mm and bx. Pathology came back a well differentiated neuroendocrine tumor. Ki 67 was felt to be quite low but could not be calculated due to lack of cellularity.     HISTORY OF PRESENTING ILLNESS  Very pleasant 83 yo male with the above history. He notes he had a 'spell' and fell and went to the hospital. There was never anything delineated as the cause of his episode. Never the less at the hospital he had a CT PE run that was negative and a CT AP that incidentally found a 1.3 cm mass in the tail of the pancreas. He followed up with GI and had a EUS/ERCP with no ductal abnormalities, but again was noted to have a 8 x 7 mm mass in the pancreatic tail. Bx found a well differentiated neuroendocrine tumor.  Due to the small size of the lesion he is on observation.    INTERIM VISIT    He is here for routine followup. He had been doing well without new issues or complaints. Today or last night he began noting some left sided abdominal pains. NO " fevers, chills or night sweats he has noted some mucous in his stools. But no blood or constipation. He had a colonoscopy 1 year ago that was unremarkable.  No n/v. He really has no other symptoms except this discomfort. Of note he has been having some left sided chest discomfort and undergoing testing all of which has been negative. No other issues.     PAST MEDICAL HISTORY  HTN, RA, Prostate cancer Melina 3 + 3 in 2009 undergoing active surveillance with minimal change in PSA since, BPH      CURRENT OUTPATIENT MEDICATIONS  Current Outpatient Medications   Medication     ascorbic acid 1000 MG TABS tablet     aspirin (ASA) 81 MG chewable tablet     cholecalciferol 50 MCG (2000) CAPS     co-enzyme Q-10 100 MG CAPS capsule     diltiazem ER (DILT-XR) 120 MG 24 hr capsule     finasteride (PROSCAR) 5 MG tablet     fish oil-omega-3 fatty acids 1000 MG capsule     Magnesium Oxide-Pyridoxine HCl 362-20 MG TABS     meclizine (ANTIVERT) 25 MG tablet     spironolactone (ALDACTONE) 25 MG tablet     sulfaSALAzine (AZULFIDINE) 500 MG tablet     vitamin C (ASCORBIC ACID) 500 MG tablet     No current facility-administered medications for this visit.        ALLERGIES     Allergies   Allergen Reactions     Atorvastatin Muscle Pain (Myalgia)     Fluvastatin      Other reaction(s): Unknown Reaction     Pravastatin Muscle Pain (Myalgia)     Simvastatin      Other reaction(s): Unknown Reaction        SOCIAL HISTORY  He is a , his wife  of cancer. He lives in [place], [place], he is a retired /. Very slight hx of tobacco quit in . No excessive etoh.     FAMILY HISTORY  Son had rectal cancer at 44.     REVIEW OF SYSTEMS  Review Of Systems  Skin: negative  Eyes: negative  Ears/Nose/Throat: negative  Respiratory: No shortness of breath, dyspnea on exertion, cough, or hemoptysis  Cardiovascular: negative  Gastrointestinal: negative  Genitourinary: negative  Musculoskeletal: negative  Neurologic:  negative  Psychiatric: negative  Hematologic/Lymphatic/Immunologic: negative  Endocrine: negative      PHYSICAL EXAM  B/P: Data Unavailable, T: Data Unavailable, P: Data Unavailable, R: Data Unavailable  Wt Readings from Last 3 Encounters:   No data found for Wt       ECOG PPS0    telephone  Psychiatric:         Mood and Affect: Mood normal.         Behavior: Behavior normal.         Thought Content: Thought content normal.         Judgment: Judgment normal.              LABORATORY AND IMAGING STUDIES    COMPARISON:   02/14/2022     FINDINGS:   Visualized portions of the lung bases demonstrates minimal left basilar   scarring.  No focal consolidation or pleural effusions.     The liver, gallbladder, spleen, adrenal glands, and kidneys demonstrate no   suspicious masses.  Bilateral low-density renal masses which demonstrate no   enhancement consistent with cysts.  Diffuse fatty infiltration of the pancreas.   Avidly enhancing mass within the tail the pancreas measuring 1.2 x 1.2 cm in   size best seen on series 4 images 59-63.     No dilated or thickened loops of bowel are visualized.  The appendix is normal   in caliber.  No pathologically enlarged retroperitoneal lymph nodes.  Mild   atherosclerotic changes involving the abdominal aorta.  No suspicious osseous   lesions.  No free fluid or free air.  The prostate gland is moderately   enlarged.  No suspicious peripelvic adenopathy.     IMPRESSION:   1. Stable appearing enhancing mass within the tail the pancreas consistent with   the patient's history of neuroendocrine tumor.   2. No suspicious retroperitoneal or peripancreatic adenopathy.   3. No evidence of metastatic disease.   4. Bilateral renal cysts.  Exam End: 08/09/22  1:17 PM            ASSESSMENT  AND RECOMMENDATIONS:    1.  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 RA on infusional therapy  3. Prostate cancer Melina 3 + 3, PSA 0.97 on active surveillance.   4.  Left sided abdominal pain      Plan    With regard to his neuroendocrine tumor in the pancreas this is stable and has not changed in the last couple of years. I would recommend an follow-up CT in 6 months and likely thereafter we can go to every 9-12 months.    I am concerned about his abdominal pain. There were no findings on his CT however this was 2 weeks ago. I reviewed his labs done through his PCP this week and these were also unremarkable. I suggested he go in for an exam today with his PCP or ED for an acute evaluation.    Halle Amaya MD on 8/23/2022 at 5:19 PM          Again, thank you for allowing me to participate in the care of your patient.        Sincerely,        Halle Amaya MD

## 2022-08-23 NOTE — NURSING NOTE
Pt states he just started taking this medication last week and this was the only one that wasn't on his med list.    Metoprolol Succinate ER 25mg     Symone FLORES

## 2022-08-23 NOTE — PROGRESS NOTES
"Clare is a 82 year old who is being evaluated via a billable telephone visit.      What phone number would you like to be contacted at? [phone]  How would you like to obtain your AVS? Joanna  Phone call duration: 18minutes    Symone FLORES    [facility]  MEDICAL ONCOLOGY    RETURN PATIENT VISIT NOTE    Reason for visit: well differentiated neuroendocrine tumor of pancreas    Oncology Treatment Summary  1. October 2020 admitted to hospital after \"found down\". Unclear what caused this. He negative CT PE run, on CT AP was incidentally found to have a 1 x 1.3 cm mass in the pancreatic tail without other abnormality. Subsequently had a EUS/ERCP 11/16/2020 where the mass was noted to be 8 x 7 mm and bx. Pathology came back a well differentiated neuroendocrine tumor. Ki 67 was felt to be quite low but could not be calculated due to lack of cellularity.     HISTORY OF PRESENTING ILLNESS  Very pleasant 81 yo male with the above history. He notes he had a 'spell' and fell and went to the hospital. There was never anything delineated as the cause of his episode. Never the less at the hospital he had a CT PE run that was negative and a CT AP that incidentally found a 1.3 cm mass in the tail of the pancreas. He followed up with GI and had a EUS/ERCP with no ductal abnormalities, but again was noted to have a 8 x 7 mm mass in the pancreatic tail. Bx found a well differentiated neuroendocrine tumor.  Due to the small size of the lesion he is on observation.    INTERIM VISIT    He is here for routine followup. He had been doing well without new issues or complaints. Today or last night he began noting some left sided abdominal pains. NO fevers, chills or night sweats he has noted some mucous in his stools. But no blood or constipation. He had a colonoscopy 1 year ago that was unremarkable.  No n/v. He really has no other symptoms except this discomfort. Of note he has been having some left sided " chest discomfort and undergoing testing all of which has been negative. No other issues.     PAST MEDICAL HISTORY  HTN, RA, Prostate cancer Melina 3 + 3 in 2009 undergoing active surveillance with minimal change in PSA since, BPH      CURRENT OUTPATIENT MEDICATIONS  Current Outpatient Medications   Medication     ascorbic acid 1000 MG TABS tablet     aspirin (ASA) 81 MG chewable tablet     cholecalciferol 50 MCG (2000) CAPS     co-enzyme Q-10 100 MG CAPS capsule     diltiazem ER (DILT-XR) 120 MG 24 hr capsule     finasteride (PROSCAR) 5 MG tablet     fish oil-omega-3 fatty acids 1000 MG capsule     Magnesium Oxide-Pyridoxine HCl 362-20 MG TABS     meclizine (ANTIVERT) 25 MG tablet     spironolactone (ALDACTONE) 25 MG tablet     sulfaSALAzine (AZULFIDINE) 500 MG tablet     vitamin C (ASCORBIC ACID) 500 MG tablet     No current facility-administered medications for this visit.        ALLERGIES     Allergies   Allergen Reactions     Atorvastatin Muscle Pain (Myalgia)     Fluvastatin      Other reaction(s): Unknown Reaction     Pravastatin Muscle Pain (Myalgia)     Simvastatin      Other reaction(s): Unknown Reaction        SOCIAL HISTORY  He is a , his wife  of cancer. He lives in [place], [place], he is a retired /. Very slight hx of tobacco quit in . No excessive etoh.     FAMILY HISTORY  Son had rectal cancer at 44.     REVIEW OF SYSTEMS  Review Of Systems  Skin: negative  Eyes: negative  Ears/Nose/Throat: negative  Respiratory: No shortness of breath, dyspnea on exertion, cough, or hemoptysis  Cardiovascular: negative  Gastrointestinal: negative  Genitourinary: negative  Musculoskeletal: negative  Neurologic: negative  Psychiatric: negative  Hematologic/Lymphatic/Immunologic: negative  Endocrine: negative      PHYSICAL EXAM  B/P: Data Unavailable, T: Data Unavailable, P: Data Unavailable, R: Data Unavailable  Wt Readings from Last 3 Encounters:   No data found for Wt       ECOG  PPS0    telephone  Psychiatric:         Mood and Affect: Mood normal.         Behavior: Behavior normal.         Thought Content: Thought content normal.         Judgment: Judgment normal.              LABORATORY AND IMAGING STUDIES    COMPARISON:   02/14/2022     FINDINGS:   Visualized portions of the lung bases demonstrates minimal left basilar   scarring.  No focal consolidation or pleural effusions.     The liver, gallbladder, spleen, adrenal glands, and kidneys demonstrate no   suspicious masses.  Bilateral low-density renal masses which demonstrate no   enhancement consistent with cysts.  Diffuse fatty infiltration of the pancreas.   Avidly enhancing mass within the tail the pancreas measuring 1.2 x 1.2 cm in   size best seen on series 4 images 59-63.     No dilated or thickened loops of bowel are visualized.  The appendix is normal   in caliber.  No pathologically enlarged retroperitoneal lymph nodes.  Mild   atherosclerotic changes involving the abdominal aorta.  No suspicious osseous   lesions.  No free fluid or free air.  The prostate gland is moderately   enlarged.  No suspicious peripelvic adenopathy.     IMPRESSION:   1. Stable appearing enhancing mass within the tail the pancreas consistent with   the patient's history of neuroendocrine tumor.   2. No suspicious retroperitoneal or peripancreatic adenopathy.   3. No evidence of metastatic disease.   4. Bilateral renal cysts.  Exam End: 08/09/22  1:17 PM            ASSESSMENT  AND RECOMMENDATIONS:    1.  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 RA on infusional therapy  3. Prostate cancer Melina 3 + 3, PSA 0.97 on active surveillance.   4. Left sided abdominal pain      Plan    With regard to his neuroendocrine tumor in the pancreas this is stable and has not changed in the last couple of years. I would recommend an follow-up CT in 6 months and likely thereafter we can go to every 9-12 months.    I am  concerned about his abdominal pain. There were no findings on his CT however this was 2 weeks ago. I reviewed his labs done through his PCP this week and these were also unremarkable. I suggested he go in for an exam today with his PCP or ED for an acute evaluation.    Halle Amaya MD on 8/23/2022 at 5:19 PM

## 2022-10-10 ENCOUNTER — HEALTH MAINTENANCE LETTER (OUTPATIENT)
Age: 82
End: 2022-10-10

## 2023-02-28 ENCOUNTER — TRANSFERRED RECORDS (OUTPATIENT)
Dept: HEALTH INFORMATION MANAGEMENT | Facility: CLINIC | Age: 83
End: 2023-02-28

## 2023-04-07 ENCOUNTER — MEDICAL CORRESPONDENCE (OUTPATIENT)
Dept: HEALTH INFORMATION MANAGEMENT | Facility: CLINIC | Age: 83
End: 2023-04-07
Payer: MEDICARE

## 2023-04-13 ENCOUNTER — TRANSCRIBE ORDERS (OUTPATIENT)
Dept: OTHER | Age: 83
End: 2023-04-13

## 2023-04-13 DIAGNOSIS — R90.89 ABNORMAL FINDING ON MRI OF BRAIN: Primary | ICD-10-CM

## 2023-05-11 NOTE — TELEPHONE ENCOUNTER
Action 5/11/23 MV 12.47pm   Action Taken Imaging request faxed to:    [facility]    Angelo Kothari     Action Hermelindaeverton Renee on 5/16/2023 at 12:54 PM   Action Taken Imaging disc received from [place]. Sent to  for processing. -ADRIANA     Action 5/23/23 MV 9.48am   Action Taken 2nd imaging request faxed to [facility]     Action 5/25/23 MV 9.06am   Action Taken Images resolved in PACS             RECORDS RECEIVED FROM: external   REASON FOR VISIT: Tremor, Parkinsonism, Abnormal finding on MRI of brain    Date of Appt: 5/31/23   NOTES (FOR ALL VISITS) STATUS DETAILS   OFFICE NOTE from referring provider Care Everywhere Dr Jeaneth Santana @ [facility]:  4/7/23   EMG Care Everywhere [facility]:  8/25/22   MEDICATION LIST Care Everywhere    IMAGING  (FOR ALL VISITS)     MRI (HEAD, NECK, SPINE) PACS [facility]:  MRI Head 3/22/23  MRI Cervical Spine 9/10/21  MRI Head 10/28/20*    [facility]:  MRI Lumbar Spine 2/28/23    [facility]:  MRI Head 11/17/22   CT [facility]HEAD, NECK, SPINE[facility] [place]:  CT Lumbar Spine 1/20/23    [facility]:  CT Head 10/27/20*

## 2023-05-31 ENCOUNTER — PRE VISIT (OUTPATIENT)
Dept: NEUROLOGY | Facility: CLINIC | Age: 83
End: 2023-05-31

## 2023-05-31 ENCOUNTER — OFFICE VISIT (OUTPATIENT)
Dept: NEUROLOGY | Facility: CLINIC | Age: 83
End: 2023-05-31
Attending: PSYCHIATRY & NEUROLOGY
Payer: MEDICARE

## 2023-05-31 VITALS
SYSTOLIC BLOOD PRESSURE: 102 MMHG | HEART RATE: 75 BPM | RESPIRATION RATE: 16 BRPM | OXYGEN SATURATION: 95 % | DIASTOLIC BLOOD PRESSURE: 69 MMHG

## 2023-05-31 DIAGNOSIS — R90.89 ABNORMAL FINDING ON MRI OF BRAIN: ICD-10-CM

## 2023-05-31 DIAGNOSIS — R26.9 ABNORMAL GAIT: Primary | ICD-10-CM

## 2023-05-31 PROBLEM — R25.2 JERKING MOVEMENTS OF EXTREMITIES: Status: ACTIVE | Noted: 2023-05-26

## 2023-05-31 PROBLEM — E26.9 HYPERALDOSTERONISM (H): Status: ACTIVE | Noted: 2021-12-27

## 2023-05-31 PROBLEM — H43.819 POSTERIOR VITREOUS DETACHMENT: Status: ACTIVE | Noted: 2021-12-27

## 2023-05-31 PROBLEM — R42 DIZZINESS: Status: ACTIVE | Noted: 2023-05-22

## 2023-05-31 PROBLEM — E87.20 LACTIC ACIDOSIS: Status: ACTIVE | Noted: 2020-10-27

## 2023-05-31 PROBLEM — Z96.1 PRESENCE OF INTRAOCULAR LENS: Status: ACTIVE | Noted: 2022-09-26

## 2023-05-31 PROBLEM — N28.89 RIGHT RENAL MASS: Status: ACTIVE | Noted: 2022-12-30

## 2023-05-31 PROBLEM — G89.29 CHRONIC LOW BACK PAIN: Status: ACTIVE | Noted: 2021-12-27

## 2023-05-31 PROBLEM — J44.9 CHRONIC OBSTRUCTIVE PULMONARY DISEASE, UNSPECIFIED (H): Status: ACTIVE | Noted: 2022-12-28

## 2023-05-31 PROBLEM — F41.9 ANXIETY: Status: ACTIVE | Noted: 2023-05-30

## 2023-05-31 PROBLEM — I10 ESSENTIAL HYPERTENSION: Status: ACTIVE | Noted: 2020-12-15

## 2023-05-31 PROBLEM — F43.21 GRIEF: Status: ACTIVE | Noted: 2020-12-15

## 2023-05-31 PROBLEM — R07.9 CHEST PAIN: Status: ACTIVE | Noted: 2023-03-20

## 2023-05-31 PROBLEM — M54.12 CERVICAL RADICULOPATHY: Status: ACTIVE | Noted: 2021-10-04

## 2023-05-31 PROBLEM — H91.90 UNSPECIFIED HEARING LOSS, UNSPECIFIED EAR: Status: ACTIVE | Noted: 2022-09-26

## 2023-05-31 PROBLEM — D3A.8 NEUROENDOCRINE TUMOR OF PANCREAS (H): Status: ACTIVE | Noted: 2020-12-15

## 2023-05-31 PROBLEM — I25.10 ARTERIOSCLEROTIC CORONARY ARTERY DISEASE: Status: ACTIVE | Noted: 2023-03-20

## 2023-05-31 PROBLEM — M54.50 CHRONIC LOW BACK PAIN: Status: ACTIVE | Noted: 2021-12-27

## 2023-05-31 PROBLEM — U09.9 POST COVID-19 CONDITION, UNSPECIFIED: Status: ACTIVE | Noted: 2022-05-18

## 2023-05-31 PROBLEM — D35.2 PITUITARY MICROADENOMA (H): Status: ACTIVE | Noted: 2022-12-30

## 2023-05-31 PROCEDURE — 99205 OFFICE O/P NEW HI 60 MIN: CPT

## 2023-05-31 PROCEDURE — 99417 PROLNG OP E/M EACH 15 MIN: CPT

## 2023-05-31 RX ORDER — FUROSEMIDE 20 MG
1 TABLET ORAL EVERY MORNING
COMMUNITY

## 2023-05-31 RX ORDER — ISOSORBIDE MONONITRATE 30 MG/1
30 TABLET, EXTENDED RELEASE ORAL
COMMUNITY
Start: 2023-05-25 | End: 2023-06-24

## 2023-05-31 RX ORDER — SERTRALINE HYDROCHLORIDE 25 MG/1
1 TABLET, FILM COATED ORAL EVERY MORNING
COMMUNITY
Start: 2023-04-05 | End: 2024-04-04

## 2023-05-31 RX ORDER — PANTOPRAZOLE SODIUM 40 MG/1
1 TABLET, DELAYED RELEASE ORAL
COMMUNITY

## 2023-05-31 RX ORDER — EZETIMIBE 10 MG/1
1 TABLET ORAL EVERY MORNING
COMMUNITY
Start: 2023-05-25 | End: 2023-06-24

## 2023-05-31 RX ORDER — METOPROLOL SUCCINATE 25 MG/1
TABLET, EXTENDED RELEASE ORAL
COMMUNITY
Start: 2022-11-07

## 2023-05-31 RX ORDER — POLYETHYLENE GLYCOL 3350 17 G/17G
8.5 POWDER, FOR SOLUTION ORAL
COMMUNITY

## 2023-05-31 RX ORDER — GABAPENTIN 400 MG/1
400 CAPSULE ORAL
COMMUNITY
Start: 2023-05-29

## 2023-05-31 ASSESSMENT — PAIN SCALES - GENERAL: PAINLEVEL: EXTREME PAIN (8)

## 2023-05-31 NOTE — PROGRESS NOTES
"Department of Neurology  Movement Disorders Division   New Patient Visit    Patient: Sean Roblero   MRN: [id number]   : 1940   Date of Visit: 2023  PCP: Leonel Quinteros MD   Referring provider: Jeaneth Santana MD    CC:  MRI changes concerning for PSP, gait imbalance    HPI:  Mr. Roblero is a 82 year old male who presents to movement disorder clinic for evaluation of gait and concern for PSP. He is accompanied by his son and daughter.    He reports first symptoms in 2023. He was getting a steroid injection in lower back, then he became really cold and shivering, blood pressure & o2 was fine. Then felt like anvil was dropped no chest, consciousness in and out, brought via ambulance to ED. This lasted for maybe 45 minutes and then had another episode while at the hospital, shaking and then body slumped. He continues to have these spells, maybe 50-60 times. Shaking is maybe 15 sec - 1 min. Then can be out for maybe 45 min or even longer. Does not recall that it happened with standing. Previously had a warning of feeling hot/cold but now can happen.     After getting out of the hospital, he has been having a headache, always on the right side. Tylenol has been bad particularly the last month. Sometimes nauseated, denies photophobia or phonophobia. No associated auras - although has had a visual floater for years in one eye, difficult to say which eye.     Ceiling spins if standing up quickly, most often after moving.     Parkinson's Disease Motor Symptom Review:    Freezing of gait: denies  Dystonia: cramps in bilateral fingers, and feet  Tremor: possibly some shaking of hands.      Parkinson's Disease Non-motor Symptom Review:    Psychiatric disturbances - frustrated.  Hallucinations - denies.  Cognitive impairment -  Denies any concerns.  Sleep disturbances - sleeping well. Family has noticed that he \"acts out nightmares\" since he was young   GI symptoms - constipation, about a year " ago2.  Urinary symptoms - increased frequency & urgency.   Balance - using a wheelchair in the past week. He has not used this before. Before last   Pain - headache - see above.  Autonomic dysfunction - dizziness with standing.  Speech - no changes.  Swallowing - he has some difficulty after spells.  Salivation - denies.  Olfaction - good    Dopamine agonist side effects - n/a.  Driving: no.  Living situation: lives in facility  Medication compliance - facility manages.  ADL's: some assistance with bathing.     MEDICATIONS reviewed & pertinent for:  Aspirin  Diltiazem   Meclizine     ROS:  All others negative except as listed above.    Past Medical History:   Diagnosis Date     Hypertension 1970     Migraines 50 yrs ago        Past Surgical History:   Procedure Laterality Date     COLONOSCOPY  2021          Current Outpatient Medications:      ascorbic acid 1000 MG TABS tablet, Take 1 tablet by mouth, Disp: , Rfl:      aspirin (ASA) 81 MG chewable tablet, 325 mg, Disp: , Rfl:      cholecalciferol 50 MCG (2000 UT) CAPS, Take 2,000 Units by mouth, Disp: , Rfl:      diltiazem ER (DILT-XR) 120 MG 24 hr capsule, TAKE 1 CAPSULE BY MOUTH TWICE A DAY, Disp: , Rfl:      ezetimibe (ZETIA) 10 MG tablet, Take 1 tablet by mouth every morning, Disp: , Rfl:      finasteride (PROSCAR) 5 MG tablet, TAKE ONE TABLET BY MOUTH DAILY, Disp: , Rfl:      fish oil-omega-3 fatty acids 1000 MG capsule, Take 1 capsule by mouth, Disp: , Rfl:      furosemide (LASIX) 20 MG tablet, Take 1 tablet by mouth every morning, Disp: , Rfl:      gabapentin (NEURONTIN) 400 MG capsule, Take 400 mg by mouth, Disp: , Rfl:      isosorbide mononitrate (IMDUR) 30 MG 24 hr tablet, Take 30 mg by mouth, Disp: , Rfl:      Liniments (VAPORIZING CREAM) 4.7-1.2-2.6 % CREA, , Disp: , Rfl:      Magnesium Oxide-Pyridoxine HCl 362-20 MG TABS, Take 1 tablet by mouth, Disp: , Rfl:      meclizine (ANTIVERT) 25 MG tablet, Take 25 mg by mouth, Disp: , Rfl:      metoprolol  succinate ER (TOPROL XL) 25 MG 24 hr tablet, , Disp: , Rfl:      pantoprazole (PROTONIX) 40 MG EC tablet, Take 1 tablet by mouth daily before breakfast, Disp: , Rfl:      polyethylene glycol (MIRALAX) 17 GM/Dose powder, Take 8.5 g by mouth, Disp: , Rfl:      sertraline (ZOLOFT) 25 MG tablet, Take 1 tablet by mouth every morning, Disp: , Rfl:      sulfaSALAzine (AZULFIDINE) 500 MG tablet, TAKE THREE TABLETS BY MOUTH TWICE A DAY WITH FOOD FOR RHEUMATOID ARTHRITIS, Disp: , Rfl:      co-enzyme Q-10 100 MG CAPS capsule, Take 100 mg by mouth (Patient not taking: Reported on 5/31/2023), Disp: , Rfl:      spironolactone (ALDACTONE) 25 MG tablet, TAKE TWO TABLETS BY MOUTH EVERY DAY FOR FLUID RETENTION (Patient not taking: Reported on 5/31/2023), Disp: , Rfl:      vitamin C (ASCORBIC ACID) 500 MG tablet, TAKE TWO TABLETS BY MOUTH DAILY (Patient not taking: Reported on 5/31/2023), Disp: , Rfl:      Allergies   Allergen Reactions     Atorvastatin Muscle Pain (Myalgia)     Fluvastatin      Other reaction(s): Unknown Reaction     Pravastatin Muscle Pain (Myalgia)     Simvastatin      Other reaction(s): Unknown Reaction        No family history on file.     Social History:  He  reports that he has quit smoking. His smoking use included cigarettes. He has a 2.00 pack-year smoking history. He has never used smokeless tobacco. He reports that he does not currently use alcohol. He reports that he does not use drugs.     PHYSICAL EXAM:  /69   Pulse 75   Resp 16   SpO2 95%      Gen: alert, active, attentive, appropriately groomed     NEURO:  MS: Alert and oriented to person, place, time, and situation.  Speech normal to comprehension.  Recent and remote memory intact.  Attention and concentration normal.  Fund of knowledge normal.    CN:  Pupils equal, round, and reactive to light. VFF. EOM normal range, no nystagmus.  Normal saccades. Facial sensation intact. Face symmetric at rest and with activation. Hearing grossly intact  to conversation. Palate rise b/l, uvula midline.  No dysarthria. Shoulder shrug symmetric and without lag bilaterally. Tongue protrudes midline, side to side without hesitation. No fasciculation or atrophy noted.    Motor:  Normal bulk and tone throughout upper and lower extremities. Strength is 5/5 throughout BUE and symmetric, 4+/5 hip flexion bilaterally with giveway weakness    Sensation:  Intact to LT and temperature in all extremities.    Coordination:  FTN without dysmetria bilaterally.    Gait:  Stands with assistance, unsteady gait but not shuffling nor with extended posture. Uses walker for assistance.    Notably a spell was witnessed in clinic, starting with patient standing and going silent, was able to sit when instructed and once in chair started shaking.    DATA REVIEWED:  MRI brain 3/2/23 - personally reviewed & notable for ratio of midbrain to basilar kwabena diameter > 0.52 which is not consistent with hummingbird sign that is seen in PSP     IMPRESSION:   1. No evidence for an acute or subacute intracranial abnormality.   Specifically, no evidence for hemorrhage or infarct.     2. Moderate diffuse parenchymal volume loss and chronic microangiopathic   changes, similar to prior.     3.  Structural changes at the level of the brainstem as detailed, which may   reflect progressive supranuclear palsy versus the sequelae of generalized   parenchymal volume loss.     4.  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      EMG/NCS LUE 8/25/22  IMPRESSION: Abnormal study.   1. The electrical findings of left C5, C7 radiculopathy.  2. No definite electrical evidence of median entrapment neuropathy at left wrist.  3. Minimally reduced left median distal motor amplitude is of unclear significance.  COMMENTS: Preliminary test results reviewed with him. Plans to follow-up with Nettie Villalba APRN, [facility] to review the test results and further plan of management.  1.  Would  consider seeking the help of one of our neurosurgeons regarding concern for left cervical radiculopathy.  The above findings need detailed clinical correlation because of the underlying symptoms of bilateral shoulder pain and possibility of hand paresthesias secondary to motor entrapment neuropathy.  As you know a minority of patients can have normal EMG study in presence of clinical features of carpal tunnel syndrome.   2.  At the same time would suggest him having formal evaluation of his shoulder by shoulder specialist before considering any procedure on his cervical spine.  3.  If clinical suspicion still remains for median entrapment apathy at wrist, would strongly encourage to have a repeat EMG study in next 3 to 4 months time.    ASSESSMENT:  Mr. Roblero is a 82 year old male who presents to movement disorder clinic for evaluation of gait and concern for PSP. Exam today was not consistent with PSP while he has an unsteady gait, the rest of his exam does not appear parkinsonian and he he has no notable limitation with vertical gaze. I personally reviewed his recent MRI and do not see a sufficient midbrain to kwabena ratio indicating focal midbrain atrophy that is seen with PSP. This diagnosis was discussed with the patient and his family and he will continue to follow-up with local neurologist for his other concerns.     Notably, he did have a spell limiting his gait examination. He went quiet and was able to nod that he felt one coming on. He did start shaking prior to being helped into a chair but was able to maintain standing despite bilateral movements. Once safely in chair, movements increased in severity but he was able to maintain seated position in chair albeit slumped to one side. Throughout the episode lasting 2-3 minutes, he was able to complete deep breathing exercises and follow very simple commands with reassurance. While I defer diagnostic work up and management of spells to his primary neurologist  (and do not myself have access to his entire work up from this perspective), I did discuss with him and family the diagnosis of functional neurologic disorder. As his involuntary movements resolved within 2-3 minutes and he was back to baseline with speech and interaction, he was discharged from clinic to go home with his family who were comfortable with the plan.    PLAN:  - no further diagnostic testing from my perspective  - encouraged physical therapy   - education provided on functional neurologic disorder     RTC as needed     Vianey Dorsey MD  Movement Disorders Fellow    132 minutes spent on the date of the encounter doing chart review, history and exam, documentation and further activities as noted above

## 2023-05-31 NOTE — PATIENT INSTRUCTIONS
"You do not have progressive nuclear palsy.     I think your balance/walking problems are related to your dizziness and long stays in bed that make your muscles not quite as strong. For this, you should continue working with physical therapy on re-building your strength.     The dizziness that you're describing is with benign paroxysmal positional vertigo (BPPV). For this you should work with physical therapy doing a specific sort of therapy called \"vestibular rehab\".     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 I will leave the diagnosis of this to your local neurologist as I don't have access to your whole work up (for example, EEG), but I have provided you with some information on this diagnosis so you can look it up more at home.     Your shaking spell today did look consistent with a functional spell. This should not prevent you from walking in a supervised environment. I recommend that you continue working with physical therapy on your walking. Maybe discuss with your facility if there's a way to do this.     As long as your spell is the same as the previous spells, no new symptoms or changes in the length and presentation, you likely do not need to go the emergency department for this. However, if you have any new symptoms, you should still go be evaluated.   "

## 2023-05-31 NOTE — Clinical Note
2023       RE: Sean Roblero  [id number] [address] [facility] [address]     Dear Colleague,    Thank you for referring your patient, Sean Roblero, to the [facility] [place] [facility]. Please see a copy of my visit note below.    Department of Neurology  Movement Disorders Division   New Patient Visit    Patient: Sean Roblero   MRN: [id number]   : 1940   Date of Visit: 2023  PCP: Leonel Quinteros MD   Referring provider: Jeaneth Santana MD    CC:  ***    HPI:  Mr. Roblero is a 82 year old ***-handed male with history significant for *** who presents to movement disorder clinic for ***. He is accompanied by his son and daughter.    He reports first symptoms in 2023. He was getting a steroid injection in lower back, then he became really cold and shivering, blood pressure & o2 was fine. Then felt like anvil was dropped no chest, consciousness in and out, brought via ambulance to ED. This lasted for maybe 45 minutes and then had another episode while at the hospital, shaking and then body slumped. He continues to have these spells, maybe 50-60 times. Shaking is maybe 15 sec - 1 min. Then can be out for maybe 45 min or even longer. Does not recall that it happened with standing. Previously had a warning of feeling hot/cold but now can happen.     After getting out of the hospital, he has been having a headache, always on the right side. Tylenol has been bad particularly the last month. Sometimes nauseated, denies photophobia or phonophobia. No associated auras - although has had a visual floater for years in one eye, difficult to say which eye.     Ceiling spins if standing up quickly, most often after moving.     Parkinson's Disease Motor Symptom Review:    Motor fluctuations:     Dyskinesia:  Duration - ***.  Disability - ***.  Wearing off:  ***.  Freezing of gait: ***  Dystonia: cramps in bilateral fingers, and  "feet  Tremor: possibly some shaking of hands.   Rigidity: ***  Bradykinesia: ***     Parkinson's Disease Non-motor Symptom Review:    Psychiatric disturbances - frustrated.  Hallucinations - denies.  Cognitive impairment -  Denies any concerns.  Sleep disturbances - sleeping well. Family has noticed that he \"acts out nightmares\" since he was young   GI symptoms - constipation, about a year ago2.  Urinary symptoms - increased frequency & urgency.   Balance - using a wheelchair in the past week. He has not used this before. Before last   Pain - headache - see above.  Autonomic dysfunction - dizziness with standing.  Speech - no changes.  Swallowing - he has some difficulty after spells.  Salivation - denies.  Olfaction - good    Dopamine agonist side effects - n/a.  Driving: no.  Living situation: lives in facility  Medication compliance - facility manages.  ADL's: some assistance with bathing.     MEDICATIONS reviewed & pertinent for:  Aspirin  Diltiazem   Meclizine     ROS:  All others negative except as listed above.    Past Medical History:   Diagnosis Date     Hypertension 1970     Migraines 50 yrs ago        Past Surgical History:   Procedure Laterality Date     COLONOSCOPY  2021          Current Outpatient Medications:      ascorbic acid 1000 MG TABS tablet, Take 1 tablet by mouth, Disp: , Rfl:      aspirin (ASA) 81 MG chewable tablet, 325 mg, Disp: , Rfl:      cholecalciferol 50 MCG (2000 UT) CAPS, Take 2,000 Units by mouth, Disp: , Rfl:      diltiazem ER (DILT-XR) 120 MG 24 hr capsule, TAKE 1 CAPSULE BY MOUTH TWICE A DAY, Disp: , Rfl:      ezetimibe (ZETIA) 10 MG tablet, Take 1 tablet by mouth every morning, Disp: , Rfl:      finasteride (PROSCAR) 5 MG tablet, TAKE ONE TABLET BY MOUTH DAILY, Disp: , Rfl:      fish oil-omega-3 fatty acids 1000 MG capsule, Take 1 capsule by mouth, Disp: , Rfl:      furosemide (LASIX) 20 MG tablet, Take 1 tablet by mouth every morning, Disp: , Rfl:      gabapentin (NEURONTIN) " 400 MG capsule, Take 400 mg by mouth, Disp: , Rfl:      isosorbide mononitrate (IMDUR) 30 MG 24 hr tablet, Take 30 mg by mouth, Disp: , Rfl:      Liniments (VAPORIZING CREAM) 4.7-1.2-2.6 % CREA, , Disp: , Rfl:      Magnesium Oxide-Pyridoxine HCl 362-20 MG TABS, Take 1 tablet by mouth, Disp: , Rfl:      meclizine (ANTIVERT) 25 MG tablet, Take 25 mg by mouth, Disp: , Rfl:      metoprolol succinate ER (TOPROL XL) 25 MG 24 hr tablet, , Disp: , Rfl:      pantoprazole (PROTONIX) 40 MG EC tablet, Take 1 tablet by mouth daily before breakfast, Disp: , Rfl:      polyethylene glycol (MIRALAX) 17 GM/Dose powder, Take 8.5 g by mouth, Disp: , Rfl:      sertraline (ZOLOFT) 25 MG tablet, Take 1 tablet by mouth every morning, Disp: , Rfl:      sulfaSALAzine (AZULFIDINE) 500 MG tablet, TAKE THREE TABLETS BY MOUTH TWICE A DAY WITH FOOD FOR RHEUMATOID ARTHRITIS, Disp: , Rfl:      co-enzyme Q-10 100 MG CAPS capsule, Take 100 mg by mouth (Patient not taking: Reported on 5/31/2023), Disp: , Rfl:      spironolactone (ALDACTONE) 25 MG tablet, TAKE TWO TABLETS BY MOUTH EVERY DAY FOR FLUID RETENTION (Patient not taking: Reported on 5/31/2023), Disp: , Rfl:      vitamin C (ASCORBIC ACID) 500 MG tablet, TAKE TWO TABLETS BY MOUTH DAILY (Patient not taking: Reported on 5/31/2023), Disp: , Rfl:      Allergies   Allergen Reactions     Atorvastatin Muscle Pain (Myalgia)     Fluvastatin      Other reaction(s): Unknown Reaction     Pravastatin Muscle Pain (Myalgia)     Simvastatin      Other reaction(s): Unknown Reaction        No family history on file.     Social History:  He  reports that he has quit smoking. His smoking use included cigarettes. He has a 2.00 pack-year smoking history. He has never used smokeless tobacco. He reports that he does not currently use alcohol. He reports that he does not use drugs.     PHYSICAL EXAM:  /69   Pulse 75   Resp 16   SpO2 95%  ***    Gen: alert, active, attentive, appropriately groomed      NEURO:***  MS: Alert and oriented to person, place, time, and situation.  Speech normal to comprehension.  Recent and remote memory intact.  Attention and concentration normal.  Fund of knowledge normal.    CN:  Pupils equal, round, and reactive to light. VFF. EOM normal range, no nystagmus.  Normal saccades. Facial sensation intact. Face symmetric at rest and with activation. Hearing grossly intact to conversation. Palate rise b/l, uvula midline.  No dysarthria. Shoulder shrug symmetric and without lag bilaterally. Tongue protrudes midline. No fasciculation or atrophy noted.    Motor:  Normal bulk and tone throughout upper and lower extremities.  No abnormal movements or fasciculations observed. Strength is 5/5 throughout and symmetric. ***    Sensation:  Intact to LT, vibration, and temperature in all extremities.    Coordination:  FTN and HTS without dysmetria bilaterally.    Gait:  Normal gait. Tandem gait intact. Romberg negative.    DATA REVIEWED:  MRI brain 3/2/23 - personally reviewed & notable for ***  IMPRESSION:   1. No evidence for an acute or subacute intracranial abnormality.   Specifically, no evidence for hemorrhage or infarct.     2. Moderate diffuse parenchymal volume loss and chronic microangiopathic   changes, similar to prior.     3.  Structural changes at the level of the brainstem as detailed, which may   reflect progressive supranuclear palsy versus the sequelae of generalized   parenchymal volume loss.     4.  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      EMG/NCS LUE 8/25/22  IMPRESSION: Abnormal study.   1. The electrical findings of left C5, C7 radiculopathy.  2. No definite electrical evidence of median entrapment neuropathy at left wrist.  3. Minimally reduced left median distal motor amplitude is of unclear significance.  COMMENTS: Preliminary test results reviewed with him. Plans to follow-up with Nettie Villalba APRN, [facility] to review the  test results and further plan of management.  1.  Would consider seeking the help of one of our neurosurgeons regarding concern for left cervical radiculopathy.  The above findings need detailed clinical correlation because of the underlying symptoms of bilateral shoulder pain and possibility of hand paresthesias secondary to motor entrapment neuropathy.  As you know a minority of patients can have normal EMG study in presence of clinical features of carpal tunnel syndrome.   2.  At the same time would suggest him having formal evaluation of his shoulder by shoulder specialist before considering any procedure on his cervical spine.  3.  If clinical suspicion still remains for median entrapment apathy at wrist, would strongly encourage to have a repeat EMG study in next 3 to 4 months time.    ASSESSMENT:  ***    PLAN:  ***    RTC ***    Vianey Dorsey MD  Movement Disorders Fellow    *** minutes spent on the date of the encounter doing chart review, history and exam, documentation and further activities as noted above       Again, thank you for allowing me to participate in the care of your patient.      Sincerely,     MOVEMENT DISORDER FELLOW

## 2023-08-20 ENCOUNTER — HEALTH MAINTENANCE LETTER (OUTPATIENT)
Age: 83
End: 2023-08-20

## 2024-08-04 ENCOUNTER — HEALTH MAINTENANCE LETTER (OUTPATIENT)
Age: 84
End: 2024-08-04

## 2025-08-16 ENCOUNTER — HEALTH MAINTENANCE LETTER (OUTPATIENT)
Age: 85
End: 2025-08-16